# Patient Record
Sex: FEMALE | NOT HISPANIC OR LATINO | Employment: UNEMPLOYED | URBAN - METROPOLITAN AREA
[De-identification: names, ages, dates, MRNs, and addresses within clinical notes are randomized per-mention and may not be internally consistent; named-entity substitution may affect disease eponyms.]

---

## 2023-10-06 DIAGNOSIS — N91.2 AMENORRHEA: Primary | ICD-10-CM

## 2023-10-25 ENCOUNTER — HOSPITAL ENCOUNTER (OUTPATIENT)
Dept: ULTRASOUND IMAGING | Facility: CLINIC | Age: 28
Discharge: HOME | End: 2023-10-25
Attending: ADVANCED PRACTICE MIDWIFE | Admitting: ADVANCED PRACTICE MIDWIFE
Payer: MEDICAID

## 2023-10-25 DIAGNOSIS — N91.2 AMENORRHEA: ICD-10-CM

## 2023-10-25 PROCEDURE — 76801 OB US < 14 WKS SINGLE FETUS: CPT

## 2023-10-25 PROCEDURE — 76801 OB US < 14 WKS SINGLE FETUS: CPT | Mod: 26 | Performed by: RADIOLOGY

## 2023-11-06 ENCOUNTER — LAB REQUISITION (OUTPATIENT)
Dept: LAB | Facility: CLINIC | Age: 28
End: 2023-11-06

## 2023-11-06 DIAGNOSIS — O09.71: ICD-10-CM

## 2023-11-06 LAB
ABO/RH(D): NORMAL
ANTIBODY SCREEN: NEGATIVE
SPECIMEN EXPIRATION DATE: NORMAL

## 2023-11-06 PROCEDURE — 86706 HEP B SURFACE ANTIBODY: CPT | Performed by: MIDWIFE

## 2023-11-06 PROCEDURE — 87389 HIV-1 AG W/HIV-1&-2 AB AG IA: CPT | Performed by: MIDWIFE

## 2023-11-06 PROCEDURE — 86780 TREPONEMA PALLIDUM: CPT | Performed by: MIDWIFE

## 2023-11-06 PROCEDURE — 86803 HEPATITIS C AB TEST: CPT | Performed by: MIDWIFE

## 2023-11-06 PROCEDURE — 86901 BLOOD TYPING SEROLOGIC RH(D): CPT | Performed by: MIDWIFE

## 2023-11-06 PROCEDURE — 86787 VARICELLA-ZOSTER ANTIBODY: CPT | Performed by: MIDWIFE

## 2023-11-06 PROCEDURE — 82306 VITAMIN D 25 HYDROXY: CPT | Performed by: MIDWIFE

## 2023-11-06 PROCEDURE — 87340 HEPATITIS B SURFACE AG IA: CPT | Performed by: MIDWIFE

## 2023-11-06 PROCEDURE — 86850 RBC ANTIBODY SCREEN: CPT | Performed by: MIDWIFE

## 2023-11-06 PROCEDURE — 86762 RUBELLA ANTIBODY: CPT | Performed by: MIDWIFE

## 2023-11-07 LAB
HBV SURFACE AB SERPL IA-ACNC: 0.46 M[IU]/ML
HBV SURFACE AB SERPL IA-ACNC: NONREACTIVE M[IU]/ML
HBV SURFACE AG SERPL QL IA: NONREACTIVE
HCV AB SERPL QL IA: NONREACTIVE
HIV 1+2 AB+HIV1 P24 AG SERPL QL IA: NONREACTIVE
RUBV IGG SERPL QL IA: 1.9 INDEX
RUBV IGG SERPL QL IA: POSITIVE
T PALLIDUM AB SER QL: NONREACTIVE
VIT D+METAB SERPL-MCNC: 11 NG/ML (ref 20–50)
VZV IGG SER QL IA: 674.3 INDEX
VZV IGG SER QL IA: POSITIVE

## 2023-11-20 ENCOUNTER — LAB REQUISITION (OUTPATIENT)
Dept: LAB | Facility: CLINIC | Age: 28
End: 2023-11-20

## 2023-11-20 ENCOUNTER — TRANSCRIBE ORDERS (OUTPATIENT)
Dept: MATERNAL FETAL MEDICINE | Facility: CLINIC | Age: 28
End: 2023-11-20
Payer: MEDICAID

## 2023-11-20 DIAGNOSIS — O09.71: ICD-10-CM

## 2023-11-20 DIAGNOSIS — Z36.89 ENCOUNTER FOR FETAL ANATOMIC SURVEY: Primary | ICD-10-CM

## 2023-11-20 DIAGNOSIS — O26.90 PREGNANCY RELATED CONDITION, ANTEPARTUM: Primary | ICD-10-CM

## 2023-11-20 PROCEDURE — 87591 N.GONORRHOEAE DNA AMP PROB: CPT | Performed by: ADVANCED PRACTICE MIDWIFE

## 2023-11-20 PROCEDURE — 87491 CHLMYD TRACH DNA AMP PROBE: CPT | Performed by: ADVANCED PRACTICE MIDWIFE

## 2023-11-21 LAB
C TRACH DNA SPEC QL NAA+PROBE: NEGATIVE
N GONORRHOEA DNA SPEC QL NAA+PROBE: NEGATIVE

## 2023-12-05 ENCOUNTER — PRE VISIT (OUTPATIENT)
Dept: MATERNAL FETAL MEDICINE | Facility: CLINIC | Age: 28
End: 2023-12-05
Payer: MEDICAID

## 2023-12-12 ENCOUNTER — OFFICE VISIT (OUTPATIENT)
Dept: MATERNAL FETAL MEDICINE | Facility: CLINIC | Age: 28
End: 2023-12-12
Attending: STUDENT IN AN ORGANIZED HEALTH CARE EDUCATION/TRAINING PROGRAM
Payer: MEDICAID

## 2023-12-12 ENCOUNTER — HOSPITAL ENCOUNTER (OUTPATIENT)
Dept: ULTRASOUND IMAGING | Facility: CLINIC | Age: 28
Discharge: HOME | End: 2023-12-12
Attending: STUDENT IN AN ORGANIZED HEALTH CARE EDUCATION/TRAINING PROGRAM
Payer: MEDICAID

## 2023-12-12 DIAGNOSIS — O26.90 PREGNANCY RELATED CONDITION, ANTEPARTUM: ICD-10-CM

## 2023-12-12 DIAGNOSIS — Z36.89 ENCOUNTER FOR FETAL ANATOMIC SURVEY: Primary | ICD-10-CM

## 2023-12-12 PROCEDURE — 99202 OFFICE O/P NEW SF 15 MIN: CPT | Mod: 25 | Performed by: STUDENT IN AN ORGANIZED HEALTH CARE EDUCATION/TRAINING PROGRAM

## 2023-12-12 PROCEDURE — 76805 OB US >/= 14 WKS SNGL FETUS: CPT

## 2023-12-12 PROCEDURE — 76805 OB US >/= 14 WKS SNGL FETUS: CPT | Mod: 26 | Performed by: STUDENT IN AN ORGANIZED HEALTH CARE EDUCATION/TRAINING PROGRAM

## 2023-12-12 NOTE — PROGRESS NOTES
Please see the full imaging report from the [facility] program under the imaging tab.      Lola Caldwell MD  Maternal Fetal Medicine

## 2024-03-06 ENCOUNTER — LAB REQUISITION (OUTPATIENT)
Dept: LAB | Facility: CLINIC | Age: 29
End: 2024-03-06
Payer: MEDICAID

## 2024-03-06 DIAGNOSIS — O09.71: ICD-10-CM

## 2024-03-06 LAB — T PALLIDUM AB SER QL: NONREACTIVE

## 2024-03-06 PROCEDURE — 87491 CHLMYD TRACH DNA AMP PROBE: CPT | Performed by: ADVANCED PRACTICE MIDWIFE

## 2024-03-06 PROCEDURE — 87491 CHLMYD TRACH DNA AMP PROBE: CPT | Mod: ORL | Performed by: ADVANCED PRACTICE MIDWIFE

## 2024-03-06 PROCEDURE — 86780 TREPONEMA PALLIDUM: CPT | Mod: ORL | Performed by: ADVANCED PRACTICE MIDWIFE

## 2024-03-06 PROCEDURE — 82306 VITAMIN D 25 HYDROXY: CPT | Performed by: ADVANCED PRACTICE MIDWIFE

## 2024-03-06 PROCEDURE — 87591 N.GONORRHOEAE DNA AMP PROB: CPT | Performed by: ADVANCED PRACTICE MIDWIFE

## 2024-03-06 PROCEDURE — 87591 N.GONORRHOEAE DNA AMP PROB: CPT | Mod: ORL | Performed by: ADVANCED PRACTICE MIDWIFE

## 2024-03-06 PROCEDURE — 86780 TREPONEMA PALLIDUM: CPT | Performed by: ADVANCED PRACTICE MIDWIFE

## 2024-03-07 LAB
C TRACH DNA SPEC QL NAA+PROBE: NEGATIVE
N GONORRHOEA DNA SPEC QL NAA+PROBE: NEGATIVE
VIT D+METAB SERPL-MCNC: 14 NG/ML (ref 20–50)

## 2024-04-17 ENCOUNTER — LAB REQUISITION (OUTPATIENT)
Dept: LAB | Facility: CLINIC | Age: 29
End: 2024-04-17
Payer: MEDICAID

## 2024-04-17 DIAGNOSIS — O09.73 SUPERVISION OF HIGH RISK PREGNANCY DUE TO SOCIAL PROBLEMS, THIRD TRIMESTER: ICD-10-CM

## 2024-04-17 DIAGNOSIS — L29.9 PRURITUS, UNSPECIFIED: ICD-10-CM

## 2024-04-17 DIAGNOSIS — Z36.89 ENCOUNTER FOR FETAL ANATOMIC SURVEY: Primary | ICD-10-CM

## 2024-04-17 DIAGNOSIS — O09.893 SUPERVISION OF OTHER HIGH RISK PREGNANCIES, THIRD TRIMESTER: ICD-10-CM

## 2024-04-17 PROCEDURE — 82239 BILE ACIDS TOTAL: CPT | Mod: ORL | Performed by: MIDWIFE

## 2024-04-17 PROCEDURE — 84450 TRANSFERASE (AST) (SGOT): CPT | Mod: ORL | Performed by: MIDWIFE

## 2024-04-17 PROCEDURE — 87081 CULTURE SCREEN ONLY: CPT | Mod: ORL | Performed by: MIDWIFE

## 2024-04-17 PROCEDURE — 82247 BILIRUBIN TOTAL: CPT | Mod: ORL | Performed by: MIDWIFE

## 2024-04-17 PROCEDURE — 84460 ALANINE AMINO (ALT) (SGPT): CPT | Mod: ORL | Performed by: MIDWIFE

## 2024-04-19 LAB
ALT SERPL W P-5'-P-CCNC: 15 U/L (ref 0–50)
AST SERPL W P-5'-P-CCNC: 26 U/L (ref 0–45)
BILE AC SERPL-SCNC: 9 UMOL/L
BILIRUB SERPL-MCNC: 0.7 MG/DL

## 2024-04-21 LAB — BACTERIA SPEC CULT: NORMAL

## 2024-04-26 ENCOUNTER — LAB REQUISITION (OUTPATIENT)
Dept: LAB | Facility: CLINIC | Age: 29
End: 2024-04-26
Payer: MEDICAID

## 2024-04-26 DIAGNOSIS — O09.73 SUPERVISION OF HIGH RISK PREGNANCY DUE TO SOCIAL PROBLEMS, THIRD TRIMESTER: ICD-10-CM

## 2024-04-26 LAB
ALT SERPL W P-5'-P-CCNC: 21 U/L (ref 0–50)
AST SERPL W P-5'-P-CCNC: 27 U/L (ref 0–45)

## 2024-04-26 PROCEDURE — 82239 BILE ACIDS TOTAL: CPT | Mod: ORL | Performed by: MIDWIFE

## 2024-04-26 PROCEDURE — 84450 TRANSFERASE (AST) (SGOT): CPT | Mod: ORL | Performed by: MIDWIFE

## 2024-04-26 PROCEDURE — 84460 ALANINE AMINO (ALT) (SGPT): CPT | Mod: ORL | Performed by: MIDWIFE

## 2024-04-27 LAB — BILE AC SERPL-SCNC: 17 UMOL/L

## 2024-05-01 ENCOUNTER — TRANSCRIBE ORDERS (OUTPATIENT)
Dept: MATERNAL FETAL MEDICINE | Facility: CLINIC | Age: 29
End: 2024-05-01
Payer: COMMERCIAL

## 2024-05-01 ENCOUNTER — APPOINTMENT (OUTPATIENT)
Dept: INTERPRETER SERVICES | Facility: CLINIC | Age: 29
End: 2024-05-01
Payer: COMMERCIAL

## 2024-05-01 DIAGNOSIS — O26.90 PREGNANCY RELATED CONDITION, ANTEPARTUM: Primary | ICD-10-CM

## 2024-05-01 DIAGNOSIS — O26.643 CHOLESTASIS DURING PREGNANCY IN THIRD TRIMESTER: ICD-10-CM

## 2024-05-01 PROBLEM — E55.9 VITAMIN D DEFICIENCY: Status: ACTIVE | Noted: 2023-11-08

## 2024-05-01 PROBLEM — O99.019 ANEMIA IN PREGNANCY: Status: ACTIVE | Noted: 2024-03-06

## 2024-05-01 PROBLEM — O09.90 HIGH-RISK PREGNANCY: Status: ACTIVE | Noted: 2023-10-25

## 2024-05-03 ENCOUNTER — HOSPITAL ENCOUNTER (OUTPATIENT)
Dept: ULTRASOUND IMAGING | Facility: CLINIC | Age: 29
Discharge: HOME | End: 2024-05-03
Attending: OBSTETRICS & GYNECOLOGY
Payer: COMMERCIAL

## 2024-05-03 ENCOUNTER — APPOINTMENT (OUTPATIENT)
Dept: INTERPRETER SERVICES | Facility: CLINIC | Age: 29
End: 2024-05-03
Payer: COMMERCIAL

## 2024-05-03 ENCOUNTER — HOSPITAL ENCOUNTER (INPATIENT)
Facility: CLINIC | Age: 29
LOS: 3 days | Discharge: HOME HEALTH | End: 2024-05-06
Attending: ADVANCED PRACTICE MIDWIFE | Admitting: ADVANCED PRACTICE MIDWIFE
Payer: COMMERCIAL

## 2024-05-03 ENCOUNTER — OFFICE VISIT (OUTPATIENT)
Dept: MATERNAL FETAL MEDICINE | Facility: CLINIC | Age: 29
End: 2024-05-03
Attending: OBSTETRICS & GYNECOLOGY
Payer: COMMERCIAL

## 2024-05-03 DIAGNOSIS — O99.013 ANEMIA DURING PREGNANCY IN THIRD TRIMESTER: ICD-10-CM

## 2024-05-03 DIAGNOSIS — O26.90 PREGNANCY RELATED CONDITION, ANTEPARTUM: ICD-10-CM

## 2024-05-03 DIAGNOSIS — O26.643 CHOLESTASIS DURING PREGNANCY IN THIRD TRIMESTER: Primary | ICD-10-CM

## 2024-05-03 LAB
ABO/RH(D): NORMAL
ANTIBODY SCREEN: NEGATIVE
ERYTHROCYTE [DISTWIDTH] IN BLOOD BY AUTOMATED COUNT: 12.5 % (ref 10–15)
HCT VFR BLD AUTO: 31.5 % (ref 35–47)
HGB BLD-MCNC: 10.6 G/DL (ref 11.7–15.7)
MCH RBC QN AUTO: 31.2 PG (ref 26.5–33)
MCHC RBC AUTO-ENTMCNC: 33.7 G/DL (ref 31.5–36.5)
MCV RBC AUTO: 93 FL (ref 78–100)
PLATELET # BLD AUTO: 178 10E3/UL (ref 150–450)
RBC # BLD AUTO: 3.4 10E6/UL (ref 3.8–5.2)
SARS-COV-2 RNA RESP QL NAA+PROBE: NEGATIVE
SPECIMEN EXPIRATION DATE: NORMAL
T PALLIDUM AB SER QL: NONREACTIVE
WBC # BLD AUTO: 5.6 10E3/UL (ref 4–11)

## 2024-05-03 PROCEDURE — 86780 TREPONEMA PALLIDUM: CPT | Performed by: ADVANCED PRACTICE MIDWIFE

## 2024-05-03 PROCEDURE — 86900 BLOOD TYPING SEROLOGIC ABO: CPT | Performed by: ADVANCED PRACTICE MIDWIFE

## 2024-05-03 PROCEDURE — 87635 SARS-COV-2 COVID-19 AMP PRB: CPT | Performed by: ADVANCED PRACTICE MIDWIFE

## 2024-05-03 PROCEDURE — 120N000002 HC R&B MED SURG/OB UMMC

## 2024-05-03 PROCEDURE — 76819 FETAL BIOPHYS PROFIL W/O NST: CPT

## 2024-05-03 PROCEDURE — 76819 FETAL BIOPHYS PROFIL W/O NST: CPT | Mod: 26 | Performed by: OBSTETRICS & GYNECOLOGY

## 2024-05-03 PROCEDURE — 36415 COLL VENOUS BLD VENIPUNCTURE: CPT | Performed by: ADVANCED PRACTICE MIDWIFE

## 2024-05-03 PROCEDURE — 84450 TRANSFERASE (AST) (SGOT): CPT | Performed by: ADVANCED PRACTICE MIDWIFE

## 2024-05-03 PROCEDURE — 85027 COMPLETE CBC AUTOMATED: CPT | Performed by: ADVANCED PRACTICE MIDWIFE

## 2024-05-03 PROCEDURE — 99214 OFFICE O/P EST MOD 30 MIN: CPT | Mod: 25 | Performed by: OBSTETRICS & GYNECOLOGY

## 2024-05-03 PROCEDURE — 84460 ALANINE AMINO (ALT) (SGPT): CPT | Performed by: ADVANCED PRACTICE MIDWIFE

## 2024-05-03 PROCEDURE — 250N000013 HC RX MED GY IP 250 OP 250 PS 637: Performed by: ADVANCED PRACTICE MIDWIFE

## 2024-05-03 PROCEDURE — 76816 OB US FOLLOW-UP PER FETUS: CPT | Mod: 26 | Performed by: OBSTETRICS & GYNECOLOGY

## 2024-05-03 RX ORDER — NALOXONE HYDROCHLORIDE 0.4 MG/ML
0.2 INJECTION, SOLUTION INTRAMUSCULAR; INTRAVENOUS; SUBCUTANEOUS
Status: DISCONTINUED | OUTPATIENT
Start: 2024-05-03 | End: 2024-05-04 | Stop reason: HOSPADM

## 2024-05-03 RX ORDER — CARBOPROST TROMETHAMINE 250 UG/ML
250 INJECTION, SOLUTION INTRAMUSCULAR
Status: DISCONTINUED | OUTPATIENT
Start: 2024-05-03 | End: 2024-05-04 | Stop reason: HOSPADM

## 2024-05-03 RX ORDER — PROCHLORPERAZINE MALEATE 10 MG
10 TABLET ORAL EVERY 6 HOURS PRN
Status: DISCONTINUED | OUTPATIENT
Start: 2024-05-03 | End: 2024-05-04 | Stop reason: HOSPADM

## 2024-05-03 RX ORDER — MISOPROSTOL 200 UG/1
400 TABLET ORAL
Status: DISCONTINUED | OUTPATIENT
Start: 2024-05-03 | End: 2024-05-04 | Stop reason: HOSPADM

## 2024-05-03 RX ORDER — IBUPROFEN 800 MG/1
800 TABLET, FILM COATED ORAL
Status: DISCONTINUED | OUTPATIENT
Start: 2024-05-03 | End: 2024-05-06 | Stop reason: HOSPADM

## 2024-05-03 RX ORDER — METHYLERGONOVINE MALEATE 0.2 MG/ML
200 INJECTION INTRAVENOUS
Status: DISCONTINUED | OUTPATIENT
Start: 2024-05-03 | End: 2024-05-04 | Stop reason: HOSPADM

## 2024-05-03 RX ORDER — CITRIC ACID/SODIUM CITRATE 334-500MG
30 SOLUTION, ORAL ORAL
Status: DISCONTINUED | OUTPATIENT
Start: 2024-05-03 | End: 2024-05-04 | Stop reason: HOSPADM

## 2024-05-03 RX ORDER — FERROUS SULFATE 325(65) MG
325 TABLET ORAL
COMMUNITY

## 2024-05-03 RX ORDER — HYDROXYZINE HYDROCHLORIDE 50 MG/1
50-100 TABLET, FILM COATED ORAL
Status: DISCONTINUED | OUTPATIENT
Start: 2024-05-03 | End: 2024-05-04 | Stop reason: HOSPADM

## 2024-05-03 RX ORDER — TRANEXAMIC ACID 10 MG/ML
1 INJECTION, SOLUTION INTRAVENOUS EVERY 30 MIN PRN
Status: DISCONTINUED | OUTPATIENT
Start: 2024-05-03 | End: 2024-05-04 | Stop reason: HOSPADM

## 2024-05-03 RX ORDER — ONDANSETRON 4 MG/1
4 TABLET, ORALLY DISINTEGRATING ORAL EVERY 6 HOURS PRN
Status: DISCONTINUED | OUTPATIENT
Start: 2024-05-03 | End: 2024-05-04 | Stop reason: HOSPADM

## 2024-05-03 RX ORDER — KETOROLAC TROMETHAMINE 30 MG/ML
30 INJECTION, SOLUTION INTRAMUSCULAR; INTRAVENOUS
Status: DISCONTINUED | OUTPATIENT
Start: 2024-05-03 | End: 2024-05-06 | Stop reason: HOSPADM

## 2024-05-03 RX ORDER — LOPERAMIDE HCL 2 MG
2 CAPSULE ORAL
Status: DISCONTINUED | OUTPATIENT
Start: 2024-05-03 | End: 2024-05-04 | Stop reason: HOSPADM

## 2024-05-03 RX ORDER — NALOXONE HYDROCHLORIDE 0.4 MG/ML
0.4 INJECTION, SOLUTION INTRAMUSCULAR; INTRAVENOUS; SUBCUTANEOUS
Status: DISCONTINUED | OUTPATIENT
Start: 2024-05-03 | End: 2024-05-04 | Stop reason: HOSPADM

## 2024-05-03 RX ORDER — OXYTOCIN 10 [USP'U]/ML
10 INJECTION, SOLUTION INTRAMUSCULAR; INTRAVENOUS
Status: DISCONTINUED | OUTPATIENT
Start: 2024-05-03 | End: 2024-05-06 | Stop reason: HOSPADM

## 2024-05-03 RX ORDER — OXYTOCIN/0.9 % SODIUM CHLORIDE 30/500 ML
100-340 PLASTIC BAG, INJECTION (ML) INTRAVENOUS CONTINUOUS PRN
Status: DISCONTINUED | OUTPATIENT
Start: 2024-05-03 | End: 2024-05-06 | Stop reason: HOSPADM

## 2024-05-03 RX ORDER — OXYTOCIN 10 [USP'U]/ML
10 INJECTION, SOLUTION INTRAMUSCULAR; INTRAVENOUS
Status: DISCONTINUED | OUTPATIENT
Start: 2024-05-03 | End: 2024-05-04 | Stop reason: HOSPADM

## 2024-05-03 RX ORDER — PROCHLORPERAZINE 25 MG
25 SUPPOSITORY, RECTAL RECTAL EVERY 12 HOURS PRN
Status: DISCONTINUED | OUTPATIENT
Start: 2024-05-03 | End: 2024-05-04 | Stop reason: HOSPADM

## 2024-05-03 RX ORDER — MORPHINE SULFATE 10 MG/ML
10 INJECTION, SOLUTION INTRAMUSCULAR; INTRAVENOUS
Status: DISCONTINUED | OUTPATIENT
Start: 2024-05-03 | End: 2024-05-04 | Stop reason: HOSPADM

## 2024-05-03 RX ORDER — OXYTOCIN/0.9 % SODIUM CHLORIDE 30/500 ML
340 PLASTIC BAG, INJECTION (ML) INTRAVENOUS CONTINUOUS PRN
Status: DISCONTINUED | OUTPATIENT
Start: 2024-05-03 | End: 2024-05-04 | Stop reason: HOSPADM

## 2024-05-03 RX ORDER — METOCLOPRAMIDE 10 MG/1
10 TABLET ORAL EVERY 6 HOURS PRN
Status: DISCONTINUED | OUTPATIENT
Start: 2024-05-03 | End: 2024-05-04 | Stop reason: HOSPADM

## 2024-05-03 RX ORDER — METOCLOPRAMIDE HYDROCHLORIDE 5 MG/ML
10 INJECTION INTRAMUSCULAR; INTRAVENOUS EVERY 6 HOURS PRN
Status: DISCONTINUED | OUTPATIENT
Start: 2024-05-03 | End: 2024-05-04 | Stop reason: HOSPADM

## 2024-05-03 RX ORDER — TERBUTALINE SULFATE 1 MG/ML
0.25 INJECTION, SOLUTION SUBCUTANEOUS
Status: DISCONTINUED | OUTPATIENT
Start: 2024-05-03 | End: 2024-05-04 | Stop reason: HOSPADM

## 2024-05-03 RX ORDER — ACETAMINOPHEN 325 MG/1
650 TABLET ORAL EVERY 4 HOURS PRN
Status: DISCONTINUED | OUTPATIENT
Start: 2024-05-03 | End: 2024-05-04 | Stop reason: HOSPADM

## 2024-05-03 RX ORDER — MISOPROSTOL 200 UG/1
800 TABLET ORAL
Status: DISCONTINUED | OUTPATIENT
Start: 2024-05-03 | End: 2024-05-04 | Stop reason: HOSPADM

## 2024-05-03 RX ORDER — SODIUM CHLORIDE, SODIUM LACTATE, POTASSIUM CHLORIDE, CALCIUM CHLORIDE 600; 310; 30; 20 MG/100ML; MG/100ML; MG/100ML; MG/100ML
INJECTION, SOLUTION INTRAVENOUS CONTINUOUS
Status: DISCONTINUED | OUTPATIENT
Start: 2024-05-03 | End: 2024-05-04 | Stop reason: HOSPADM

## 2024-05-03 RX ORDER — ONDANSETRON 2 MG/ML
4 INJECTION INTRAMUSCULAR; INTRAVENOUS EVERY 6 HOURS PRN
Status: DISCONTINUED | OUTPATIENT
Start: 2024-05-03 | End: 2024-05-04 | Stop reason: HOSPADM

## 2024-05-03 RX ORDER — LOPERAMIDE HCL 2 MG
4 CAPSULE ORAL
Status: DISCONTINUED | OUTPATIENT
Start: 2024-05-03 | End: 2024-05-04 | Stop reason: HOSPADM

## 2024-05-03 RX ORDER — FENTANYL CITRATE 50 UG/ML
100 INJECTION, SOLUTION INTRAMUSCULAR; INTRAVENOUS
Status: DISCONTINUED | OUTPATIENT
Start: 2024-05-03 | End: 2024-05-04 | Stop reason: HOSPADM

## 2024-05-03 RX ORDER — MISOPROSTOL 100 UG/1
25 TABLET ORAL
Status: DISCONTINUED | OUTPATIENT
Start: 2024-05-03 | End: 2024-05-04 | Stop reason: HOSPADM

## 2024-05-03 RX ADMIN — MISOPROSTOL 25 MCG: 100 TABLET ORAL at 18:23

## 2024-05-03 RX ADMIN — MISOPROSTOL 25 MCG: 100 TABLET ORAL at 20:37

## 2024-05-03 ASSESSMENT — ACTIVITIES OF DAILY LIVING (ADL)
ADLS_ACUITY_SCORE: 18
ADLS_ACUITY_SCORE: 35
ADLS_ACUITY_SCORE: 18

## 2024-05-03 NOTE — PROGRESS NOTES
Please see full imaging report from [facility] program under imaging tab.    Thank-you for referring your patient for ultrasound assessment.    I discussed the findings on today's ultrasound with the patient using the assistance of an iPad  for Mauritanian. The patient was able to repeat back my recommendations and plan.     She has elevated bile acids, noctural itching and is at term. She was not started on ursodiol as she initially reported resolved itching, but at this point in gestation, her options are to start ursodiol or to undergo induction of labor. After shared-decision making, and conversation with her CNM team, we determined that the best next step for her would be induction of labor this evening or tomorrow morning. She is open to considering this plan.     We talked also about recurrence risk in future pregnancies as well today.     If you have questions regarding today's evaluation or if we can be of further service, please contact the [facility].     I spent a total of 35 minutes on the date of this encounter including preparing to see the patient (reviewing medical records/tests), counseling and discussing the plan of care, documenting the visit in the electronic medical record, and communicating with other health care professionals and/or care coordination.    Luis Haas MD  Maternal Fetal Medicine

## 2024-05-03 NOTE — NURSING NOTE
() used for patient's ultrasound and provider visit today at [facility]. Patient reports positive fetal movement, denies pain, denies contractions, leaking of fluid, or bleeding.  Reports itching last night on her arms, legs and chest. Reviewed medications that were prescribed by midwives in the clinic. Education provided to patient on today's ultrasound.  SBAR given to [facility] MD, see their note in Epic.    Recommendations for patient to be induced today or tomorrow, Estefani Randall [facility] called and discussed recommendations with Dr. Haas. [facility] at 4pm. Patient verbalized understanding of these instructions, given instructions on how to get there and her questions were answered. Labor charge (Rabia) was called and given report.

## 2024-05-03 NOTE — PROGRESS NOTES
"ADMIT NOTE  =================  38w3d    Ama Kingsley is a 29 year old female with an Patient's last menstrual period was 08/15/2023. and Estimated Date of Delivery: May 14, 2024 is [facility]/2024 at 4:54 PM for induction of labor.  Indication: cholestasis.     In-person  present for admission.     HPI  ================  Ama Kingsley is a  at 38w3d, presenting to unit for IOL d/t cholestasis. She was seen today by MFM, where they reviewed her increased bile acids (17 on ), not treated on ursodiol, and was offered initiation of ursodiol therapy or IOL as she is term, and patient elected for IOL. She notes infrequent contractions, denies LOF and vaginal bleeding, and endorses normal fetal movement.  Denies fever, cough, SOB or chest pain. Denies having contact with anyone who is Covid-19 positive. Pt has had Covid-19 Vaccinations.  Agreeable to Covid-19 testing  Contractions- irreg  Fetal movement- active  ROM- no.  Vaginal bleeding- none  GBS- negative  FOB- is not involved. Planning labor support from her family, not here yet .    Weight gain- 154 - 171 lbs, Total weight gain- 17 lbs  Height- 5'4.5\"  BMI- 26  First prenatal visit at 11 weeks, Total visits- 8    PROBLEM LIST  =================  Patient Active Problem List    Diagnosis Date Noted    Cholestasis during pregnancy in third trimester 2024     Priority: Medium    Anemia in pregnancy 2024     Priority: Medium     Formatting of this note might be different from the original.  3/6/24 iron sent to pharmacy      Vitamin D deficiency 2023     Priority: Medium     Formatting of this note might be different from the original.  3/6/24: 14      High-risk pregnancy 10/25/2023     Priority: Medium     Formatting of this note might be different from the original.  [facility] CNM   Partner's name: not involved Servando - will offer some financial support  [x] Entered on [facility] active OB patient " list  [x] NOB folder  [x] First tri screen   declined  [x] QS/AFP declined  [x] Started ASA low risk   [x] Fetal anatomy US ordered  [X] Rubella immune  [x] Varicella Immune  [ ] Hep B Immune- #1   [ ] Pap POSTPARTUM    [x] EOB folder  [x] FLU shot  [x] COVID vax  [x] GCT, passed  [NA] Rhogam if needed, date:  [NA] TOLAC consent done  [ ] Waterbirth declines,consent done  [ ] Breast pump msg sent to RN team  [x] Car seat   [x] TDAP given 3/6/24  [ ] PP Contraception plan: If tubal,consent date:  [ ] Labor plans: Brother may be able to come, desires   [ ] : desires, msg sent!  [x] Infant feeding plan: breast  [x] Infant pediatrician: [facility]    [x] GBS neg  [ ] OTC PP meds sent   [X] PP Support: The neighbor will help with cooking and laundry after the baby is born  Also one of her daughters is 10 and is able to help with a lot of chores    Servando lives elsewhere and provides financial support but they are not in a romantic relationship         HISTORIES  ============  No Known Allergies  History reviewed. No pertinent past medical history.  History reviewed. No pertinent surgical history..  History reviewed. No pertinent family history.  Social History     Tobacco Use    Smoking status: Never    Smokeless tobacco: Never   Substance Use Topics    Alcohol use: Not Currently     OB History    Para Term  AB Living   3 2 2 0 0 2   SAB IAB Ectopic Multiple Live Births   0 0 0 0 2      # Outcome Date GA Lbr Mino/2nd Weight Sex Type Anes PTL Lv   3 Current            2 Term      Vag-Spont   KAYKAY   1 Term      Vag-Spont   KAYKAY        LABS:   ===========  Prenatal Labs:  Rhogam not indicated   Lab Results   Component Value Date    AS Negative 2023    RUQIGG Positive 2023    HEPBANG Nonreactive 2023    HIAGAB Nonreactive 2023     Rubella immune    GBS negative -     Other labs:  COVID-19 PCR Results           No data to display              COVID-19 Antibody Results, Testing  for Immunity           No data to display               Results for orders placed or performed during the hospital encounter of 24 (from the past 24 hour(s))   [facility] US Comprehensive Single F/U    Narrative            Comp Follow Up  ---------------------------------------------------------------------------------------------------------  Pat. Name: MAYURI HENRY       Study Date:  2024 8:33am  Pat. NO:  [id number]        Referring  MD: NEERAJ BORGES  Site:  [facility]       Sonographer: Deloris Ly RDMS   :  1995        Age:   29  ---------------------------------------------------------------------------------------------------------    INDICATION  ---------------------------------------------------------------------------------------------------------  Intrahepatic cholestasis of pregnancy - not currently on ursodiol      METHOD  ---------------------------------------------------------------------------------------------------------  Transabdominal ultrasound examination. View: Sufficient      PREGNANCY  ---------------------------------------------------------------------------------------------------------  Blount pregnancy. Number of fetuses: 1      DATING  ---------------------------------------------------------------------------------------------------------                                           Date                                Details                                                                                      Gest. age                      CARMINE  LMP                                  8/15/2023                                                                                                                         37 w + 3 d                     2024  Prior assessment               10/25/2023                       GA: 11 w + 1 d                                                                           38 w + 3 d                     2024  U/S                                    5/3/2024                          based upon AC, BPD, Femur                                                       38 w + 5 d                     5/12/2024  Assigned dating                  Dating performed on 12/12/2023, based on the prior assessment (on 10/25/2023)                   38 w + 3 d                     5/14/2024      GENERAL EVALUATION  ---------------------------------------------------------------------------------------------------------  Cardiac activity present.  bpm.  Fetal movements present.  Presentation cephalic.  Placenta Posterior, No Previa, > 2 cm from internal os.  Umbilical cord 3 vessel cord.  Amniotic fluid Amount of AF: normal. MVP 5.8 cm.      FETAL BIOMETRY  ---------------------------------------------------------------------------------------------------------  Main Fetal Biometry:  BPD                                        94.3                    mm                         38w 3d                Hadlock  OFD                                        131.7                  mm                         -/-                 Nicolaides  HC                                          361.2                  mm                          -/-                Hadlock  AC                                          365.6                  mm                          40w 3d        98%        Hadlock  Femur                                      73.2                   mm                          37w 3d                Hadlock  Fetal Weight Calculation:  EFW                                       3,907                  g                                     91%        Hadlock  EFW (lb,oz)                             8 lb 10                oz  EFW by                                        Hadlock (BPD-HC-AC-FL)  Head / Face / Neck Biometry:                                             4.2                     mm      FETAL  ANATOMY  ---------------------------------------------------------------------------------------------------------  The following structures appear normal:  Head / Neck                         Cranium. Head size. Head shape. Lateral ventricles. Midline falx. Cavum septi pellucidi. Cerebellum. Cisterna magna. Thalami.  Face                                   Lips. Profile. Nose.  Heart / Thorax                      4-chamber view. RVOT view. LVOT view. 3-vessel-trachea view.                                             Diaphragm.  Abdomen                             Stomach. Kidneys. Bladder.  Spine                                  Cervical spine. Thoracic spine. Lumbar spine. Sacral spine.    Gender: male.      BIOPHYSICAL PROFILE  ---------------------------------------------------------------------------------------------------------  2: Fetal breathing movements  2: Gross body movements  2: Fetal tone  2: Amniotic fluid volume  8/8 Biophysical profile score  Interpretation: normal      MATERNAL STRUCTURES  ---------------------------------------------------------------------------------------------------------  Cervix                                  Suboptimal  Right Ovary                          Not examined  Left Ovary                            Not examined      RECOMMENDATION  ---------------------------------------------------------------------------------------------------------  Thank-you for referring your patient for ultrasound assessment.    I discussed the findings on today's ultrasound with the patient using the assistance of an iPad  for Romanian. The patient was able to repeat back my  recommendations and plan.    She has elevated bile acids, noctural itching and is at term. She was not started on ursodiol as she initially reported resolved itching, but at this point in gestation, her  options are to start ursodiol or to undergo induction of labor. After shared-decision making, and  "conversation with her CNM team, we determined that the best next step for  her would be induction of labor this evening or tomorrow morning. She is open to considering this plan.    We talked also about recurrence risk in future pregnancies as well today.    If you have questions regarding today's evaluation or if we can be of further service, please contact the [facility].    I spent a total of 35 minutes on the date of this encounter including preparing to see the patient (reviewing medical records/tests), counseling and discussing the plan of  care, documenting the visit in the electronic medical record, and communicating with other health care professionals and/or care coordination.        Impression    IMPRESSION  ---------------------------------------------------------------------------------------------------------  1. Blount pregnancy at 38w 3d gestational age.  2. None of the anomalies commonly detected by ultrasound were evident in the limited fetal anatomic survey as described above.  3. Growth parameters and estimated fetal weight were consistent with gestational age predicted by assigned CARMINE. EFW 91%.  4. The amniotic fluid volume appeared normal.  5. BPP is reassuring at 8/8, performed due to diagnosis of intrahepatic cholestasis of pregnancy (ICP).  6. Cephalic lie.           ROS  =========  Pt denies significant respiratory, cardiovacular, GI, or muscular/skeletalcomplaints.    See RN data base ROS.     PHYSICAL EXAM:  ===============  /62 (BP Location: Left arm, Patient Position: Semi-Vazquez's, Cuff Size: Adult Regular)   Pulse 75   Temp 98.4  F (36.9  C) (Oral)   Resp 20   Ht 1.65 m (5' 4.96\")   LMP 08/15/2023   General appearance: comfortable  GENERAL APPEARANCE: healthy, alert and no distress  RESP: normal respiratory effort  CV: normal perfusion  ABDOMEN:  soft, nontender, no epigastric pain  SKIN: no suspicious lesions or rashes  NEURO: Denies headache, " blurred vision, other vision changes  PSYCH: mentation appears normal. and affect normal/bright    Abdomen: gravid, vertex fetus per Leopold's, non-tender between contractions.   Cephalic presentation confirmed by US today with MFM  EFW-  8 lbs 10oz   CONTRACTIONS: irreg  FETAL HEART TONES: continuous EFM- baseline 145 with moderate variability and positive accelerations. No decelerations.  PELVIC EXAM: closed/30%/-2. Posterior/med  URBINA SCORE: 2  BLOODY SHOW: no   ROM:no  FLUID: clear and none  ROMPLUS: not done    # Pain Assessment:   Ama keene pain level was assessed and she currently denies pain.        ASSESSMENT:  ==============    IUP @ 38w3d admitted for induction of labor.  Indication: cholestasis   NST REACTIVE  Fetal Heart Tones - category one  GBS- negative  Covid- pending  Patient Active Problem List   Diagnosis    High-risk pregnancy    Anemia in pregnancy    Vitamin D deficiency    Cholestasis during pregnancy in third trimester       PLAN:  ===========  -Admit - see IP orders  - Reviewed IOL process and expectations with patient. Discussed methods of cervical ripening, reviewed cervix closed on exam and recommended oral or vaginal misoprostol, patient prefers PO.   - Cervical ripening with oral Misoprostol ordered, risks and benefits reviewed with pt. Agreeable to plan.  - Ambulation, hydration, position changes, birthing ball and tub options to facilitate labor reviewed with pt .  - Pain medication options of nitrous oxide, fentanyl IV and epidural anesthesia reviewed with pt. Pt is undecided, and unsure what she used with previous labors.  - Therapeutic sleep with hydroxyzine and morphine offered to patient,overnight as needed.   - Reevaluate as clinically indicated.    I, Sherri Rahman, am serving as a scribe; to document services personally performed by  Estefani Randall CNM based on data collection and the provider's statements to me.     FAYE Gorman  I agree with the PFSH and ROS  as completed by FAYE Gorman except for changes made by me. The remainder of the encounter was performed by me and scribed by FAYE Gorman. The scribed note accurately reflects my personal services and decisions made by me.   JARRETT Rod CNM

## 2024-05-04 LAB
ALT SERPL W P-5'-P-CCNC: 16 U/L (ref 0–50)
AST SERPL W P-5'-P-CCNC: 24 U/L (ref 0–45)

## 2024-05-04 PROCEDURE — 250N000009 HC RX 250

## 2024-05-04 PROCEDURE — 120N000002 HC R&B MED SURG/OB UMMC

## 2024-05-04 PROCEDURE — 59410 OBSTETRICAL CARE: CPT | Performed by: ADVANCED PRACTICE MIDWIFE

## 2024-05-04 PROCEDURE — 3E0P7VZ INTRODUCTION OF HORMONE INTO FEMALE REPRODUCTIVE, VIA NATURAL OR ARTIFICIAL OPENING: ICD-10-PCS | Performed by: ADVANCED PRACTICE MIDWIFE

## 2024-05-04 PROCEDURE — 999N000016 HC STATISTIC ATTENDANCE AT DELIVERY

## 2024-05-04 PROCEDURE — 258N000003 HC RX IP 258 OP 636: Performed by: ADVANCED PRACTICE MIDWIFE

## 2024-05-04 PROCEDURE — 250N000013 HC RX MED GY IP 250 OP 250 PS 637: Performed by: ADVANCED PRACTICE MIDWIFE

## 2024-05-04 PROCEDURE — 3E033VJ INTRODUCTION OF OTHER HORMONE INTO PERIPHERAL VEIN, PERCUTANEOUS APPROACH: ICD-10-PCS | Performed by: ADVANCED PRACTICE MIDWIFE

## 2024-05-04 PROCEDURE — 250N000011 HC RX IP 250 OP 636: Performed by: ADVANCED PRACTICE MIDWIFE

## 2024-05-04 PROCEDURE — 59300 EPISIOTOMY OR VAGINAL REPAIR: CPT | Mod: GC | Performed by: STUDENT IN AN ORGANIZED HEALTH CARE EDUCATION/TRAINING PROGRAM

## 2024-05-04 PROCEDURE — 0KQM0ZZ REPAIR PERINEUM MUSCLE, OPEN APPROACH: ICD-10-PCS | Performed by: ADVANCED PRACTICE MIDWIFE

## 2024-05-04 PROCEDURE — 10907ZC DRAINAGE OF AMNIOTIC FLUID, THERAPEUTIC FROM PRODUCTS OF CONCEPTION, VIA NATURAL OR ARTIFICIAL OPENING: ICD-10-PCS | Performed by: ADVANCED PRACTICE MIDWIFE

## 2024-05-04 PROCEDURE — 250N000009 HC RX 250: Performed by: ADVANCED PRACTICE MIDWIFE

## 2024-05-04 RX ORDER — OXYTOCIN/0.9 % SODIUM CHLORIDE 30/500 ML
1-24 PLASTIC BAG, INJECTION (ML) INTRAVENOUS CONTINUOUS
Status: DISCONTINUED | OUTPATIENT
Start: 2024-05-04 | End: 2024-05-04 | Stop reason: HOSPADM

## 2024-05-04 RX ORDER — MISOPROSTOL 200 UG/1
800 TABLET ORAL
Status: DISCONTINUED | OUTPATIENT
Start: 2024-05-04 | End: 2024-05-06 | Stop reason: HOSPADM

## 2024-05-04 RX ORDER — DOCUSATE SODIUM 100 MG/1
100 CAPSULE, LIQUID FILLED ORAL 2 TIMES DAILY
Status: DISCONTINUED | OUTPATIENT
Start: 2024-05-04 | End: 2024-05-06 | Stop reason: HOSPADM

## 2024-05-04 RX ORDER — FENTANYL CITRATE-0.9 % NACL/PF 10 MCG/ML
100 PLASTIC BAG, INJECTION (ML) INTRAVENOUS EVERY 5 MIN PRN
Status: CANCELLED | OUTPATIENT
Start: 2024-05-04

## 2024-05-04 RX ORDER — HYDROCORTISONE 25 MG/G
CREAM TOPICAL 3 TIMES DAILY PRN
Status: DISCONTINUED | OUTPATIENT
Start: 2024-05-04 | End: 2024-05-06 | Stop reason: HOSPADM

## 2024-05-04 RX ORDER — LOPERAMIDE HCL 2 MG
2 CAPSULE ORAL
Status: DISCONTINUED | OUTPATIENT
Start: 2024-05-04 | End: 2024-05-06 | Stop reason: HOSPADM

## 2024-05-04 RX ORDER — TRANEXAMIC ACID 10 MG/ML
1 INJECTION, SOLUTION INTRAVENOUS EVERY 30 MIN PRN
Status: DISCONTINUED | OUTPATIENT
Start: 2024-05-04 | End: 2024-05-06 | Stop reason: HOSPADM

## 2024-05-04 RX ORDER — ACETAMINOPHEN 325 MG/1
650 TABLET ORAL EVERY 4 HOURS PRN
Status: DISCONTINUED | OUTPATIENT
Start: 2024-05-04 | End: 2024-05-06 | Stop reason: HOSPADM

## 2024-05-04 RX ORDER — DOCUSATE SODIUM 100 MG/1
100 CAPSULE, LIQUID FILLED ORAL DAILY
Status: DISCONTINUED | OUTPATIENT
Start: 2024-05-05 | End: 2024-05-04

## 2024-05-04 RX ORDER — BISACODYL 10 MG
10 SUPPOSITORY, RECTAL RECTAL DAILY PRN
Status: DISCONTINUED | OUTPATIENT
Start: 2024-05-04 | End: 2024-05-06 | Stop reason: HOSPADM

## 2024-05-04 RX ORDER — MODIFIED LANOLIN
OINTMENT (GRAM) TOPICAL
Status: DISCONTINUED | OUTPATIENT
Start: 2024-05-04 | End: 2024-05-06 | Stop reason: HOSPADM

## 2024-05-04 RX ORDER — HYDROXYZINE HYDROCHLORIDE 50 MG/1
100 TABLET, FILM COATED ORAL
Status: DISCONTINUED | OUTPATIENT
Start: 2024-05-04 | End: 2024-05-04 | Stop reason: HOSPADM

## 2024-05-04 RX ORDER — NALBUPHINE HYDROCHLORIDE 20 MG/ML
2.5-5 INJECTION, SOLUTION INTRAMUSCULAR; INTRAVENOUS; SUBCUTANEOUS EVERY 6 HOURS PRN
Status: CANCELLED | OUTPATIENT
Start: 2024-05-04

## 2024-05-04 RX ORDER — FENTANYL/ROPIVACAINE/NS/PF 2MCG/ML-.1
PLASTIC BAG, INJECTION (ML) EPIDURAL
Status: CANCELLED | OUTPATIENT
Start: 2024-05-04

## 2024-05-04 RX ORDER — CARBOPROST TROMETHAMINE 250 UG/ML
250 INJECTION, SOLUTION INTRAMUSCULAR
Status: DISCONTINUED | OUTPATIENT
Start: 2024-05-04 | End: 2024-05-06 | Stop reason: HOSPADM

## 2024-05-04 RX ORDER — IBUPROFEN 800 MG/1
800 TABLET, FILM COATED ORAL EVERY 6 HOURS PRN
Status: DISCONTINUED | OUTPATIENT
Start: 2024-05-04 | End: 2024-05-06 | Stop reason: HOSPADM

## 2024-05-04 RX ORDER — OXYTOCIN 10 [USP'U]/ML
INJECTION, SOLUTION INTRAMUSCULAR; INTRAVENOUS
Status: DISCONTINUED
Start: 2024-05-04 | End: 2024-05-05 | Stop reason: WASHOUT

## 2024-05-04 RX ORDER — OXYTOCIN 10 [USP'U]/ML
10 INJECTION, SOLUTION INTRAMUSCULAR; INTRAVENOUS
Status: DISCONTINUED | OUTPATIENT
Start: 2024-05-04 | End: 2024-05-06 | Stop reason: HOSPADM

## 2024-05-04 RX ORDER — MISOPROSTOL 200 UG/1
TABLET ORAL
Status: DISCONTINUED
Start: 2024-05-04 | End: 2024-05-05 | Stop reason: HOSPADM

## 2024-05-04 RX ORDER — ONDANSETRON 2 MG/ML
4 INJECTION INTRAMUSCULAR; INTRAVENOUS EVERY 6 HOURS PRN
Status: CANCELLED | OUTPATIENT
Start: 2024-05-04

## 2024-05-04 RX ORDER — OXYTOCIN/0.9 % SODIUM CHLORIDE 30/500 ML
340 PLASTIC BAG, INJECTION (ML) INTRAVENOUS CONTINUOUS PRN
Status: DISCONTINUED | OUTPATIENT
Start: 2024-05-04 | End: 2024-05-06 | Stop reason: HOSPADM

## 2024-05-04 RX ORDER — MISOPROSTOL 100 UG/1
25 TABLET ORAL EVERY 4 HOURS PRN
Status: DISCONTINUED | OUTPATIENT
Start: 2024-05-04 | End: 2024-05-04 | Stop reason: HOSPADM

## 2024-05-04 RX ORDER — SODIUM CHLORIDE, SODIUM LACTATE, POTASSIUM CHLORIDE, CALCIUM CHLORIDE 600; 310; 30; 20 MG/100ML; MG/100ML; MG/100ML; MG/100ML
INJECTION, SOLUTION INTRAVENOUS CONTINUOUS PRN
Status: DISCONTINUED | OUTPATIENT
Start: 2024-05-04 | End: 2024-05-04 | Stop reason: HOSPADM

## 2024-05-04 RX ORDER — LOPERAMIDE HCL 2 MG
4 CAPSULE ORAL
Status: DISCONTINUED | OUTPATIENT
Start: 2024-05-04 | End: 2024-05-06 | Stop reason: HOSPADM

## 2024-05-04 RX ORDER — METHYLERGONOVINE MALEATE 0.2 MG/ML
200 INJECTION INTRAVENOUS
Status: DISCONTINUED | OUTPATIENT
Start: 2024-05-04 | End: 2024-05-06 | Stop reason: HOSPADM

## 2024-05-04 RX ORDER — ONDANSETRON 4 MG/1
4 TABLET, ORALLY DISINTEGRATING ORAL EVERY 6 HOURS PRN
Status: CANCELLED | OUTPATIENT
Start: 2024-05-04

## 2024-05-04 RX ORDER — LIDOCAINE HYDROCHLORIDE 10 MG/ML
INJECTION, SOLUTION EPIDURAL; INFILTRATION; INTRACAUDAL; PERINEURAL
Status: COMPLETED
Start: 2024-05-04 | End: 2024-05-04

## 2024-05-04 RX ORDER — MISOPROSTOL 200 UG/1
400 TABLET ORAL
Status: DISCONTINUED | OUTPATIENT
Start: 2024-05-04 | End: 2024-05-06 | Stop reason: HOSPADM

## 2024-05-04 RX ORDER — LIDOCAINE 40 MG/G
CREAM TOPICAL
Status: DISCONTINUED | OUTPATIENT
Start: 2024-05-04 | End: 2024-05-04 | Stop reason: HOSPADM

## 2024-05-04 RX ADMIN — KETOROLAC TROMETHAMINE 30 MG: 30 INJECTION, SOLUTION INTRAMUSCULAR at 23:10

## 2024-05-04 RX ADMIN — FENTANYL CITRATE 100 MCG: 50 INJECTION INTRAMUSCULAR; INTRAVENOUS at 18:45

## 2024-05-04 RX ADMIN — FENTANYL CITRATE 100 MCG: 50 INJECTION INTRAMUSCULAR; INTRAVENOUS at 22:29

## 2024-05-04 RX ADMIN — Medication 2 MILLI-UNITS/MIN: at 11:09

## 2024-05-04 RX ADMIN — LIDOCAINE HYDROCHLORIDE 20 ML: 10 INJECTION, SOLUTION EPIDURAL; INFILTRATION; INTRACAUDAL; PERINEURAL at 22:30

## 2024-05-04 RX ADMIN — MISOPROSTOL 25 MCG: 100 TABLET ORAL at 03:12

## 2024-05-04 RX ADMIN — HYDROXYZINE HYDROCHLORIDE 100 MG: 50 TABLET, FILM COATED ORAL at 02:06

## 2024-05-04 RX ADMIN — SODIUM CHLORIDE, POTASSIUM CHLORIDE, SODIUM LACTATE AND CALCIUM CHLORIDE: 600; 310; 30; 20 INJECTION, SOLUTION INTRAVENOUS at 18:56

## 2024-05-04 RX ADMIN — SODIUM CHLORIDE, POTASSIUM CHLORIDE, SODIUM LACTATE AND CALCIUM CHLORIDE: 600; 310; 30; 20 INJECTION, SOLUTION INTRAVENOUS at 11:09

## 2024-05-04 RX ADMIN — FENTANYL CITRATE 100 MCG: 50 INJECTION INTRAMUSCULAR; INTRAVENOUS at 20:50

## 2024-05-04 ASSESSMENT — ACTIVITIES OF DAILY LIVING (ADL)
ADLS_ACUITY_SCORE: 18

## 2024-05-04 NOTE — PROGRESS NOTES
"Blood pressure 105/61, pulse 75, temperature 98.6  F (37  C), temperature source Oral, resp. rate 18, height 1.65 m (5' 4.96\"), last menstrual period 08/15/2023.  Patient Vitals for the past 24 hrs:   BP Temp Temp src Pulse Resp Height   05/04/24 0525 105/61 98.6  F (37  C) Oral -- 18 --   05/04/24 0210 106/63 98.2  F (36.8  C) Oral -- 18 --   05/04/24 0005 114/66 98.2  F (36.8  C) Oral -- 16 --   05/03/24 2046 109/65 98.8  F (37.1  C) Oral -- 20 --   05/03/24 1631 106/62 98.4  F (36.9  C) Oral 75 20 1.65 m (5' 4.96\")     General appearance: uncomfortable with contractions   per Ipad  IOL for ICP with bile acids 17. Not itching now.  Pt dosing throughout noc. Had one dose of vaginal miso and porter that came out at 0530a.     CONTACTIONS: every 2-4 minutes, moderate, and cramping  Pitocin- none,  Antibiotics- none  FETAL HEART TONES: continuous EFM- baseline 145 with moderate variability and positive accelerations. No decelerations.  ROM: not ruptured  PELVIC EXAM:deferred    Results for orders placed or performed during the hospital encounter of 05/03/24   Treponema Abs w Reflex to RPR and Titer     Status: Normal   Result Value Ref Range    Treponema Antibody Total Nonreactive Nonreactive   Asymptomatic COVID-19 Virus (Coronavirus) by PCR Nose     Status: Normal    Specimen: Nose; Swab   Result Value Ref Range    SARS CoV2 PCR Negative Negative    Narrative    Testing was performed using the Xpert Xpress SARS-CoV-2 Assay on the Cepheid Gene-Xpert Instrument Systems. Additional information about this Emergency Use Authorization (EUA) assay can be found via the Lab Guide. This test should be ordered for the detection of SARS-CoV-2 in individuals who meet SARS-CoV-2 clinical and/or epidemiological criteria as well as from individuals without symptoms or other reasons to suspect COVID-19. Test performance for asymptomatic patients has only been established in anterior nasal swab specimens. This test is for " in vitro diagnostic use under the FDA EUA for laboratories certified under CLIA to perform high complexity testing. This test has not been FDA cleared or approved. A negative result does not rule out the presence of PCR inhibitors in the specimen or target RNA concentration below the limit of detection for the assay. The possibility of a false negative should be considered if the patient's recent exposure or clinical presentation suggests COVID-19. This test was validated by the [facility]. This laboratory is certified under the Clinical Laboratory Improvement Amendments (CLIA) as qualified to perform high complexity laboratory testing.     CBC with platelets     Status: Abnormal   Result Value Ref Range    WBC Count 5.6 4.0 - 11.0 10e3/uL    RBC Count 3.40 (L) 3.80 - 5.20 10e6/uL    Hemoglobin 10.6 (L) 11.7 - 15.7 g/dL    Hematocrit 31.5 (L) 35.0 - 47.0 %    MCV 93 78 - 100 fL    MCH 31.2 26.5 - 33.0 pg    MCHC 33.7 31.5 - 36.5 g/dL    RDW 12.5 10.0 - 15.0 %    Platelet Count 178 150 - 450 10e3/uL   Adult Type and Screen     Status: None   Result Value Ref Range    ABO/RH(D) O POS     Antibody Screen Negative Negative    SPECIMEN EXPIRATION DATE [id number]    ABO/Rh type and screen     Status: None    Narrative    The following orders were created for panel order ABO/Rh type and screen.  Procedure                               Abnormality         Status                     ---------                               -----------         ------                     Adult Type and Screen[[id number]]                            Final result                 Please view results for these tests on the individual orders.       ASSESSMENT:  ==============  IUP @ 38w4d for induction of labor.  Indication: ICP   Fetal Heart Rate Tracing category one  GBS- negative  Patient Active Problem List   Diagnosis    High-risk pregnancy    Anemia in pregnancy    Vitamin D deficiency    Cholestasis  during pregnancy in third trimester    Labor and delivery, indication for care     PLAN:  ===========  Ambulation, hydration, position changes, birthing ball/sling and tub options to facilitate labor.  Pain medication- knows options for pain medication. Will let us know if she would like anything  Reevaluate in 2 hours prn  Continue labor induction with Pitocin if contactions space out. Pt agreeable to plan  JARRETT EscobarM

## 2024-05-04 NOTE — PROGRESS NOTES
"  SUBJECTIVE:  ==============  Ama HASSAN Amarilis Kingsley reports the porter balloon came out when she got up and went to the bathroom. Noting bloody show  Declines pain medication at this time  General appearance: Mild discomfort with contractions    Support: Servando is in the chair sleeping by her side      OBJECTIVE:  ==============  VITALS  Blood pressure 106/63, pulse 75, temperature 98.6  F (37  C), temperature source Oral, resp. rate 18, height 1.65 m (5' 4.96\"), last menstrual period 08/15/2023.    FETAL HEART RATE ASSESSMENT:  Baseline rate 135, normal  Variability moderate  Accelerations present   Decelerations not present       CONTRACTIONS: Contractions every 3 minutes.  Palpate: moderate  Pitocin- none,  Antibiotics- none    ROM: not ruptured  PELVIC EXAM:PELVIC EXAM: 4/ 60%/ posterior/ average/ -2 Forrester score 6  # Pain Assessment:      2024     5:30 AM   Current Pain Score   Patient currently in pain? yes   - Ama is experiencing pain due to contractions. Pain management was discussed and the plan was created in a collaborative fashion.  Ama's response to the current recommendations: engaged  - declines pain medications at this time      Assessment: EFM interpretation suggests absence of concern for fetal metabolic acidemia at this time due to accelerations present, heart rate: normal baseline, and variability: moderate      Labor course:  5/3/24  1745: SVE closed, thick, -2 / posterior. PO Miso ordered  1823 PO Miso #1   PO Miso #2  24  0200 SVE 2cm/60/-2/posterior/moderate. Porter placed, filled to 75mL  0206 Vistaril 100mg  0312 PV Miso 1  0526 Porter out SVE 4cm/60%/-2/posterior/mod Forrester 6    ASSESSMENT:  ==============  Ama Garciaelliott Kingsley  29 year old  female  Estimated Date of Delivery: May 14, 2024  IUP @ 38w4d IOL for cholestasis   in early labor   Fetal Heart Rate Tracing category one over the last 120 minutes  GBS- negative  negative COVID PCR test    Patient Active " Problem List   Diagnosis    High-risk pregnancy    Anemia in pregnancy    Vitamin D deficiency    Cholestasis during pregnancy in third trimester    Labor and delivery, indication for care          PLAN:  ===========  -Reviewed SVE and excellent progress  -Reviewed pain medication options of Nitrous Oxide, Fentanyl IV and epidural reviewed with pt. Ama declines pain medication at this time.  -Ambulation, hydration, position changes, birthing ball/sling and tub options to facilitate labor.  -Reviewed that we are unable to give her any medications for promote labor until after 712 due to vaginal misoprostol administration  -encouraged rest now  -Will give report to on coming JAMEY Parisi, JARRETT, JAMEY

## 2024-05-04 NOTE — PLAN OF CARE
VSS, afebrile. See flowsheets for EFM and TOCO monitoring. Patient coping with pain by walking and hot packs. Patient was able to get some rest overnight. Vg miso x1 given, porter bulb out. Plan to reassess 4 hours after last vg miso. Report given to RENATO Navarro.

## 2024-05-04 NOTE — PROGRESS NOTES
"Blood pressure 102/58, pulse 59, temperature 98  F (36.7  C), temperature source Oral, resp. rate 18, height 1.65 m (5' 4.96\"), last menstrual period 08/15/2023.  Patient Vitals for the past 24 hrs:   BP Temp Temp src Pulse Resp Height   05/04/24 0807 102/58 98  F (36.7  C) Oral 59 18 --   05/04/24 0525 105/61 98.6  F (37  C) Oral -- 18 --   05/04/24 0210 106/63 98.2  F (36.8  C) Oral -- 18 --   05/04/24 0005 114/66 98.2  F (36.8  C) Oral -- 16 --   05/03/24 2046 109/65 98.8  F (37.1  C) Oral -- 20 --   05/03/24 1631 106/62 98.4  F (36.9  C) Oral 75 20 1.65 m (5' 4.96\")     General appearance: comfortable  Resting. Contractions spaced out. Plan starting pitocin after eating and shower.  CONTACTIONS: every 2-5 minutes and mild  Pitocin- none,  Antibiotics- none  FETAL HEART TONES: continuous EFM- baseline 135 with moderate variability and positive accelerations. No decelerations.  ROM: not ruptured  PELVIC EXAM:deferred    ASSESSMENT:  ==============  IUP @ 38w4d for induction of labor.  Indication: ICP   Fetal Heart Rate Tracing category one  GBS- negative  Patient Active Problem List   Diagnosis    High-risk pregnancy    Anemia in pregnancy    Vitamin D deficiency    Cholestasis during pregnancy in third trimester    Labor and delivery, indication for care     PLAN:  ===========  Ambulation, hydration, position changes, birthing ball/sling and tub options to facilitate labor.  Pain medication- prn  Continue labor induction with Pitocin   JARRETT Escobar CNM    "

## 2024-05-04 NOTE — PROGRESS NOTES
"  SUBJECTIVE:  ==============  Ama SONYA Kingsley has been able to rest on and off since arrival. She is open to another SVE and possible placement of a Ceballos balloon  General appearance: comfortable    Support: alone. Her cousin is now not able to come because she has to work. Servando, the FOB (but they are not longer together), will come and offer support in the am      OBJECTIVE:  ==============  VITALS  Blood pressure 106/63, pulse 75, temperature 98.2  F (36.8  C), temperature source Oral, resp. rate 18, height 1.65 m (5' 4.96\"), last menstrual period 08/15/2023.    FETAL HEART RATE ASSESSMENT:  Baseline rate 135, normal  Variability moderate  Accelerations present   Decelerations not present       CONTRACTIONS: Contractions every 2-4 minutes.  Palpate: mild  Pitocin- none,  Antibiotics- none    ROM: not ruptured  PELVIC EXAM:PELVIC EXAM: 2/ 60%/ Posterior/ average/ -2 Forrester 5    # Pain Assessment:      2024     2:10 AM   Current Pain Score   Patient currently in pain? yes   Ama keene pain level was assessed and she currently denies pain.    Interested in Vistaril      Assessment: EFM interpretation suggests absence of concern for fetal metabolic acidemia at this time due to accelerations present, heart rate: normal baseline, and variability: moderate    Labor course:    5/3/24  1745: SVE closed, thick, -2 / posterior. PO Miso ordered  1823 PO Miso #1   PO Miso #2  24  0200 SVE 2cm/60/-2/posterior/moderate. Ceballos placed, filled to 75mL      ASSESSMENT:  ==============  Ama SONYA Kingsley  29 year old  female  Estimated Date of Delivery: May 14, 2024  IUP @ 38w4d IOL for cholestasis   Not in labor  Fetal Heart Rate Tracing category one over the last 120 minutes  GBS- negative  Negative COVID PCR test    Patient Active Problem List   Diagnosis    High-risk pregnancy    Anemia in pregnancy    Vitamin D deficiency    Cholestasis during pregnancy in third trimester    Labor and delivery, " indication for care          PLAN:  ===========  -Encouraged Ama to utilize Vistaril and Morphine as needed to help her sleep  -Ambulation, hydration, position changes, birthing ball/sling and tub options to facilitate labor.  Pain medication options of Nitrous Oxide, Fentanyl IV and epidural reviewed with pt. Pt is interested in nitrous oxide, open to hearing options  -Cervical ripening with vaginal Misoprostol and cervical porter bulb risks and benefits reviewed with pt. Agreeable to plan.   -Anticipate progress and NSVB.   -Reevaluate progress in 2-3 hours or sooner with a change in status.    Addendum: 0500 RN reports Porter remains in place, Ama is resting on and off. Cat 1 FHT    JARRETT Velez, CNM

## 2024-05-04 NOTE — PLAN OF CARE
Data: Patient admitted to room 473 at 1600. Patient is a . Prenatal record reviewed.   OB History    Para Term  AB Living   3 2 2 0 0 2   SAB IAB Ectopic Multiple Live Births   0 0 0 0 2      # Outcome Date GA Lbr Mino/2nd Weight Sex Type Anes PTL Lv   3 Current            2 Term      Vag-Spont   KAYKAY   1 Term      Vag-Spont   KAYKAY   .  Medical History: History reviewed. No pertinent past medical history..  Gestational age 38w3d. Vital signs per doc flowsheet. Fetal movement present. Patient reports Induction Of Labor   as reason for admission. Support persons cousin present but had to leave, she will return tomorrow or sooner if needed.  Action: Verbal consent for EFM, external fetal monitors applied. Admission assessment completed. Patient educated on labor process. Patient instructed to report any concerns related to the pregnancy to her nurse/physician. Patient oriented to room, call light in reach.  present for admission process.  Response: JAVIER Randall CNM informed of arrival. Plan per provider is miso. Patient verbalized understanding of education and verbalized agreement with plan.     SL started and miso given po. Pt coping well. Monitoring shows moderate variability with accels, no decels. Contractions have increased to every 2-3 minutes and becoming more uncomfortable. Using warm packs and position changes, tolerating well.

## 2024-05-04 NOTE — PROGRESS NOTES
"Blood pressure 122/67, pulse 74, temperature 98  F (36.7  C), temperature source Oral, resp. rate 18, height 1.65 m (5' 4.96\"), last menstrual period 08/15/2023.  Patient Vitals for the past 24 hrs:   BP Temp Temp src Pulse Resp Height   05/04/24 1109 122/67 98  F (36.7  C) Oral -- 18 --   05/04/24 1010 93/50 97.9  F (36.6  C) Oral 74 18 --   05/04/24 0807 102/58 98  F (36.7  C) Oral 59 18 --   05/04/24 0525 105/61 98.6  F (37  C) Oral -- 18 --   05/04/24 0210 106/63 98.2  F (36.8  C) Oral -- 18 --   05/04/24 0005 114/66 98.2  F (36.8  C) Oral -- 16 --   05/03/24 2046 109/65 98.8  F (37.1  C) Oral -- 20 --   05/03/24 1631 106/62 98.4  F (36.9  C) Oral 75 20 1.65 m (5' 4.96\")     In person   General appearance: comfortable, up walking in room  Feels contr are getting stronger, no bleeding  CONTACTIONS: every 4 minutes, moderate, cramping, and back pain  Pitocin- 2 mu/min.,  Antibiotics- none  FETAL HEART TONES: continuous EFM- baseline 150 with moderate variability and late deceleration x1, not repeated.  ROM: not ruptured  PELVIC EXAM:deferred    ASSESSMENT:  ==============  IUP @ 38w4d for induction of labor.  Indication: ICP   Fetal Heart Rate Tracing category two  GBS- negative  Patient Active Problem List   Diagnosis    High-risk pregnancy    Anemia in pregnancy    Vitamin D deficiency    Cholestasis during pregnancy in third trimester    Labor and delivery, indication for care     PLAN:  ===========  Close observation  Ambulation, hydration, position changes, birthing ball/sling and tub options to facilitate labor.  Continue labor induction with  Pitocin  Shannan Conn, APRN CNM    "

## 2024-05-04 NOTE — PROGRESS NOTES
"Blood pressure 105/61, pulse 78, temperature 98.5  F (36.9  C), temperature source Oral, resp. rate 18, height 1.65 m (5' 4.96\"), last menstrual period 08/15/2023.  Patient Vitals for the past 24 hrs:   BP Temp Temp src Pulse Resp Height   05/04/24 1314 105/61 98.5  F (36.9  C) Oral 78 18 --   05/04/24 1109 122/67 98  F (36.7  C) Oral -- 18 --   05/04/24 1010 93/50 97.9  F (36.6  C) Oral 74 18 --   05/04/24 0807 102/58 98  F (36.7  C) Oral 59 18 --   05/04/24 0525 105/61 98.6  F (37  C) Oral -- 18 --   05/04/24 0210 106/63 98.2  F (36.8  C) Oral -- 18 --   05/04/24 0005 114/66 98.2  F (36.8  C) Oral -- 16 --   05/03/24 2046 109/65 98.8  F (37.1  C) Oral -- 20 --   05/03/24 1631 106/62 98.4  F (36.9  C) Oral 75 20 1.65 m (5' 4.96\")     In person   General appearance: comfortable  Standing at bedside. Family here visiting. Feels contractions are about the same  CONTACTIONS: every 2-5 minutes, moderate, and cramping  Pitocin- 8 mu/min.,  Antibiotics- none  FETAL HEART TONES: continuous EFM- baseline 145 with moderate variability and positive accelerations. No decelerations.  ROM: not ruptured  PELVIC EXAM:pt declined    ASSESSMENT:  ==============  IUP @ 38w4d for induction of labor.  Indication: cholestasis   Fetal Heart Rate Tracing category one  GBS- negative  Patient Active Problem List   Diagnosis    High-risk pregnancy    Anemia in pregnancy    Vitamin D deficiency    Cholestasis during pregnancy in third trimester    Labor and delivery, indication for care     PLAN:  ===========  Discussed SVE and evaluation for AROM to facilitate labor. Pt declines and would like to continue with pitocin IOL  Ambulation, hydration, position changes, birthing ball/sling and tub options to facilitate labor.  Pain medication- prn  Continue labor induction with Pitocin, titrate to contr.  Shannan Conn, APRN CNM    "

## 2024-05-05 LAB — HGB BLD-MCNC: 10.2 G/DL (ref 11.7–15.7)

## 2024-05-05 PROCEDURE — 120N000002 HC R&B MED SURG/OB UMMC

## 2024-05-05 PROCEDURE — 36415 COLL VENOUS BLD VENIPUNCTURE: CPT | Performed by: ADVANCED PRACTICE MIDWIFE

## 2024-05-05 PROCEDURE — 85018 HEMOGLOBIN: CPT | Performed by: ADVANCED PRACTICE MIDWIFE

## 2024-05-05 PROCEDURE — 722N000001 HC LABOR CARE VAGINAL DELIVERY SINGLE

## 2024-05-05 PROCEDURE — 250N000013 HC RX MED GY IP 250 OP 250 PS 637: Performed by: ADVANCED PRACTICE MIDWIFE

## 2024-05-05 RX ORDER — ACETAMINOPHEN 325 MG/1
650 TABLET ORAL EVERY 4 HOURS PRN
Qty: 60 TABLET | Refills: 1 | Status: SHIPPED | OUTPATIENT
Start: 2024-05-05

## 2024-05-05 RX ORDER — AMOXICILLIN 250 MG
1 CAPSULE ORAL 2 TIMES DAILY PRN
Qty: 60 TABLET | Refills: 1 | Status: SHIPPED | OUTPATIENT
Start: 2024-05-05

## 2024-05-05 RX ORDER — IBUPROFEN 600 MG/1
600 TABLET, FILM COATED ORAL EVERY 6 HOURS PRN
Qty: 60 TABLET | Refills: 1 | Status: SHIPPED | OUTPATIENT
Start: 2024-05-05

## 2024-05-05 RX ADMIN — IBUPROFEN 800 MG: 800 TABLET, FILM COATED ORAL at 11:42

## 2024-05-05 RX ADMIN — ACETAMINOPHEN 650 MG: 325 TABLET, FILM COATED ORAL at 21:18

## 2024-05-05 RX ADMIN — ACETAMINOPHEN 650 MG: 325 TABLET, FILM COATED ORAL at 15:53

## 2024-05-05 RX ADMIN — DOCUSATE SODIUM 100 MG: 100 CAPSULE, LIQUID FILLED ORAL at 11:44

## 2024-05-05 RX ADMIN — IBUPROFEN 800 MG: 800 TABLET, FILM COATED ORAL at 05:12

## 2024-05-05 RX ADMIN — DOCUSATE SODIUM 100 MG: 100 CAPSULE, LIQUID FILLED ORAL at 21:18

## 2024-05-05 RX ADMIN — IBUPROFEN 800 MG: 800 TABLET, FILM COATED ORAL at 18:04

## 2024-05-05 ASSESSMENT — ACTIVITIES OF DAILY LIVING (ADL)
ADLS_ACUITY_SCORE: 22
ADLS_ACUITY_SCORE: 19
ADLS_ACUITY_SCORE: 22
ADLS_ACUITY_SCORE: 19
ADLS_ACUITY_SCORE: 22
ADLS_ACUITY_SCORE: 19
ADLS_ACUITY_SCORE: 19
ADLS_ACUITY_SCORE: 22
ADLS_ACUITY_SCORE: 19
ADLS_ACUITY_SCORE: 22
ADLS_ACUITY_SCORE: 22
ADLS_ACUITY_SCORE: 19
ADLS_ACUITY_SCORE: 18
ADLS_ACUITY_SCORE: 19
ADLS_ACUITY_SCORE: 22
ADLS_ACUITY_SCORE: 19
ADLS_ACUITY_SCORE: 22

## 2024-05-05 NOTE — L&D DELIVERY NOTE
Delivery Summary  DELIVERY NOTE:  Brief Labor Course: [facility] pt, IOL for cholestasis. Bile acids 17, ALT and AST wnl. GBS neg. Given oral miso, vaginal miso and porter catheter for cervical ripening. Pitocin started after porter catheter came out. Minimal cervical change over next several hours. Pt agreeable to AROM clear fluid and began having strong painful contractions after that. IV fentanyl for pain medication x2. Noted to have variable decelerations at 9cm with involuntary pushing. Pushed well to crown. NICU called for IV fentanyl one hour before birth  Delivery Note:    viable male with loose CAN reduced and nuchal hand. Placed on moms abd and spont lusty cry. NICU dismissed. IV with pitocin opened after delivery of baby. Perineum inspected noted deep second to anal sphincter and Dr Gautam akins to evaluate for possible third degree laceration. After inspection, Dr Florez agreed with deep second degree laceration and did repair with Dr Chaparro.  A reinforcing stitch with 2-0 vicryl placed over capsule and second degree laceration repaired with 3-0 vicryl over 1% lidocaine. See their note. Qbl pending, EBL 250cc. Mom and baby stable    IUP at 38 weeks gestation delivered on May 4, 2024.     delivery of a viable Male infant.  Weight : 8 pounds 13 ounces   Apgars of 9 at 1 minute and 9 at 5 minutes.  Labor was induced.  Medications administered  in labor:  Pain Rx Fentanyl; Antibiotics No; Other   Perineum: 2nd degree  Placenta-mechanism: spontaneous, intact,  IV oxytocin was given After delivery of baby  Quantitative Blood Loss was pending. EBL 250cc.  Complications of labor and delivery: Dysfunctional Labor, Nuchal cord, and Nuchal hand  Anticipated Discharge Date: 24  Birth attendants: Shannan Conn, JARRETT HASSAN Amarilis Teetee MRN# [id number]   Age: 29 year old YOB: 1995     ASSESSMENT & PLAN:        Ernie Valera-Ama [[id number]]       Labor Events     labor?: No   steroids: None  Labor Type: Induction/Cervical ripening  Predominate monitoring during 1st stage: continuous electronic fetal monitoring     Antibiotics received during labor?: No     Rupture identifier: Sac 1  Rupture date/time: 24 2100   Rupture type: Artificial Rupture of Membranes  Fluid color: Clear  Fluid odor: Normal     Induction: Misoprostol, Mechanical ripening agent  Induction date/time:      Cervical ripening date/time:      Indications for induction: Other Pregnant Patient Indications (Comment to specify)     Augmentation: Oxytocin, AROM  Indications for augmentation: Ineffective Contraction Pattern       Delivery/Placenta Date and Time      Delivery Date: 24 Delivery Time: 10:00 PM   Placenta Date/Time: 2024 10:12 PM  Oxytocin given at the time of delivery: after delivery of baby  Delivering clinician: Shannan Marshall APRN CNM   Other personnel present at delivery:  Provider Role   Estefania Farris, RN Registered Nurse   Nydia Whipple RN Registered Nurse             Vaginal Counts       Initial count performed by 2 team members:  Two Team Members   Estefania Marshall cnm         Needles Suture Needles Sponges (RETIRED) Instruments   Initial counts 2  5    Added to count  2     Relief counts       Final counts 2 2 5            Placed during labor Accounted for at the end of labor   FSE     IUPC     Cervidil                               Apgars    Living status: Living   1 Minute 5 Minute 10 Minute 15 Minute 20 Minute   Skin color: 1  1       Heart rate: 2  2       Reflex irritability: 2  2       Muscle tone: 2  2       Respiratory effort: 2  2       Total: 9  9              Cord      Cord Complications: Nuchal   Nuchal Intervention: reduced         Nuchal cord description: loose nuchal cord         Cord Blood Disposition: Lab    Gases Sent?: No    Delayed cord clamping?: Yes    Cord Clamping Delay  (seconds):  seconds    Stem cell collection?: No            Resuscitation    Methods: None  [place] Care at Delivery: Called to attend the delivery due to maternal opioid administration during labor. Infant delivered with spontaneous cry and respirations. NICU team not needed and dismissed.     JARRETT Valiente, NNP-BC, May 4, 2024 10:04 PM   Advanced Practice Providers  [facility]           [place] Measurements      Weight: 8 lb 13 oz           Labor Events and Shoulder Dystocia    Fetal Tracing Prior to Delivery: Category 2  Shoulder dystocia present?: Neg       Delivery (Maternal) (Provider to Complete) (851285)    Episiotomy: None  Perineal lacerations: 2nd Repaired?: Yes   Repair suture: 2-0 Vicryl, 3-0 Vicryl  Number of repair packets: 2  Genital tract inspection done: Pos       Blood Loss  Mother: Amarilis Kingsley Ama J #[id number]     Start of Mother's Information      Delivery Blood Loss  24 1000 - 24 2304      None                 End of Mother's Information  Mother: Amarilis Ama Kingsley #[id number]                Delivery - Provider to Complete (430071)    Delivering clinician: Shannan Marshall APRN CNM  Delivery Type (Choose the 1 that will go to the Birth History): Vaginal, Spontaneous                         Other personnel:  Provider Role   Estefania Farris, RN Registered Nurse   Nydia Whipple RN Registered Nurse                    Placenta    Date/Time: 2024 10:12 PM  Removal: Spontaneous  Disposition: Hospital disposal             Anesthesia    Method: INTRAVENOUS      Analgesic:  BIRTH HISTORY: ANALGESIC   FENTANYL (OPIOID AGONISTS)                 Presentation and Position    Presentation: Vertex     Occiput Anterior                     JARRETT Escobar CNM

## 2024-05-05 NOTE — PLAN OF CARE
IV Pitocin  infusing, patient states is coping with contractions, VSS, see flow sheet for FHR and contraction pattern.  Will continue to monitor and will notify provider is there is a change in status. Anticipate .  Goal Outcome Evaluation:      Plan of Care Reviewed With: patient, friend    Overall Patient Progress: improving

## 2024-05-05 NOTE — CONSULTS
"OB Consult Note    Contacted by CNM to assess Ama Kingsley for perineal laceration after .     O:  /68   Pulse 65   Temp 98.8  F (37.1  C)   Resp 18   Ht 1.65 m (5' 4.96\")   LMP 08/15/2023     : Deep second degree perineal tear with intact anal sphincter. Deep reinforcing suture placed using 2-0 Vicryl. Second degree repaired with 3-0 Vicryl. Good hemostasis. Rectal exam with intact anal sphincter and no evidence of occult 4th degree laceration or sutures within the rectum.    A/P: 29 year old yo  at 38w4d s/p PPD#0 s/p . Consulted by CNM service for evaluation of perineal laceration repaired as above. No further intervention required.    Discussed and seen with Dr. Florez.    Rene Chaparro MD, MPH  Ob/Gyn Resident, PGY-3  24 10:40 PM     I participated in all aspects of Ama Kingsley's perineal repair with Dr. Chaparro on 2024 and agree with the details in this note with edits by me.     Joslyn Florez MD    "

## 2024-05-05 NOTE — LACTATION NOTE
This note was copied from a baby's chart.  Consult for:  3rd baby, mom's provider request support for Ama with latching baby.  In person  present throughout visit, mom's provider working with them prior to lactation and RN came in working with same  after LC visit.     Infant Name:     Infant's Primary Care Clinic: [facility]    Delivery Information:  Vaginal delivery at 38w4d on 2024, 10:00 PM. AGA @ 8# 13 ounce birthweight (near 90th, 89.54 percentile). Diaper output excellent thus far, just 24 hours old at time of visit.     Maternal Health History:    Information for the patient's mother:  Ama Valera [[id number]]     Patient Active Problem List   Diagnosis    High-risk pregnancy    Anemia in pregnancy    Vitamin D deficiency    Cholestasis during pregnancy in third trimester     (normal spontaneous vaginal delivery)    Second degree perineal laceration     and   Information for the patient's mother:  Ama Valera [[id number]]     Medications Prior to Admission   Medication Sig Dispense Refill Last Dose    ferrous sulfate (FEROSUL) 325 (65 Fe) MG tablet Take 325 mg by mouth daily (with breakfast)       Prenatal Vit-Fe Fumarate-FA (PRENATAL MULTIVITAMIN  PLUS IRON) 27-1 MG TABS Take 1 tablet by mouth daily             Maternal Breast Exam:  Breasts are soft and symmetrical with bilateral intact, everted nipples. We did not discuss hand expression this visit. ?    Breastfeeding/ Lactation History: Ama  her first two children for one year each, reports it went well and she had great milk supply. This is her first delivery in [facility].     Oral exam of baby:  Normal jaw and palate, good length of tongue palpable beyond lingual frenulum attachment, extends beyond lower gum ridge & organized when sucking on finger.     Feeding History: Breastfeeding well, minimal assist though she  well with two other children who are now 6 and 10 years  old.     Feeding Assessment:  Baby brought to mom, she helped him latch then winced with shallow latch. With permission, broke seal and shared ways to shape areola prior to latching and aim lower areola to lower lip, tucking nipple in roof of mouth to get deeper latch. It took a few tries to get him on deeper but when she did, noted improved comfort and deeper jaw pulls, audible swallowing that mom could also hear.     Education:   [x] Expected  feeding patterns in the first few days (pg. 38 of Your Guide to To Postpartum and  Care)/ the Second Night  [x] Stages of milk production  [] Benefits of hand expression of colostrum  [] Early feeding cues     [] Benefits of feeding on cue  [] Benefits of skin to skin  [x] Breastfeeding positions  [x] Tips to get and maintain a deep latch  [x] Nutritive vs.non-nutritive sucking  [x] Gentle breast compressions as needed to enhance milk transfer  [x] How to tell when baby is finished  [x] How to tell if baby is getting enough  [x] Expected  output  [] [place] weight loss  [x] Infant Feeding Log in [place], brought feedings and diapers up to date thus far.   [] [employer] Breastfeeding/Pumping videos  [x] Signs breastfeeding is going well (comfortable latch, audible swallows, age appropriate output and weight loss)    [x] Tips to prevent engorgement  [x] Signs of engorgement  [x] Tips to manage engorgement  [] Pumping recommendations (based on patient need)  [] CDC breast pump part/infant feeding supplies cleaning recommendations  [x] Inpatient breastfeeding support  [x] Outpatient lactation resources    Handouts: Infant Feeding Log (Week 1, Your Guide to Postpartum &  Care Book) and [facility] Lactation Resources    Home Breast Pump: did not ask    Plan: Continue breastfeeding on cue with RN support as needed, goal of 8-12 feedings per day.     Encourage frequent skin to skin and hand expression.     Follow up with outpatient lactation  consultant  as needed after discharge though experienced mom with history of  well two previous children and great milk supply. Family plans to follow up with [facility] clinic, if they did want further support shared location options and central scheduling phone number.        Petra Ovalles RN, IBCLC   Lactation Consultant  [place]: [facility] [phone]  Office: [phone]

## 2024-05-05 NOTE — PROGRESS NOTES
"Blood pressure 124/74, pulse 65, temperature 98.2  F (36.8  C), temperature source Oral, resp. rate 18, height 1.65 m (5' 4.96\"), last menstrual period 08/15/2023.  Patient Vitals for the past 24 hrs:   BP Temp Temp src Pulse Resp   05/04/24 1920 124/74 -- -- -- --   05/04/24 1808 125/71 98.2  F (36.8  C) Oral -- 18   05/04/24 1708 119/76 98.5  F (36.9  C) Oral 65 18   05/04/24 1608 121/80 98.2  F (36.8  C) Oral 57 18   05/04/24 1508 108/61 98.5  F (36.9  C) Oral 67 18   05/04/24 1407 101/65 -- -- 70 18   05/04/24 1314 105/61 98.5  F (36.9  C) Oral 78 18   05/04/24 1109 122/67 98  F (36.7  C) Oral -- 18   05/04/24 1010 93/50 97.9  F (36.6  C) Oral 74 18   05/04/24 0807 102/58 98  F (36.7  C) Oral 59 18   05/04/24 0525 105/61 98.6  F (37  C) Oral -- 18   05/04/24 0210 106/63 98.2  F (36.8  C) Oral -- 18   05/04/24 0005 114/66 98.2  F (36.8  C) Oral -- 16   05/03/24 2046 109/65 98.8  F (37.1  C) Oral -- 20     General appearance: uncomfortable with contractions   per phone  Pt had some relief from IV fentanyl. Feeling pain and pressure with cont. No involuntary pushing  Discussed AROM to facilitate labor. Pt will consider  CONTACTIONS: every 2-5 minutes, moderate, and cramping  Pitocin- 18 mu/min.,  Antibiotics- none  FETAL HEART TONES: continuous EFM- baseline 150 with moderate variability and positive accelerations. Intermittent early decelerations.  ROM: not ruptured  PELVIC EXAM:deferred    ASSESSMENT:  ==============  IUP @ 38w4d for induction of labor.  Indication: ICP   Fetal Heart Rate Tracing category one  GBS- negative  Patient Active Problem List   Diagnosis    High-risk pregnancy    Anemia in pregnancy    Vitamin D deficiency    Cholestasis during pregnancy in third trimester    Labor and delivery, indication for care     PLAN:  ===========  Close observation for second stage.  Ambulation, hydration, position changes, birthing ball/sling and tub options to facilitate labor.   Continue with " julianne Conn, JARRETT HOOKSM

## 2024-05-05 NOTE — DISCHARGE SUMMARY
[facility] Discharge Summary    Ama Kingsley MRN# [id number]   Age: 29 year old YOB: 1995     Date of Admission:  5/3/2024  Date of Discharge::  24 may go home 24 am  Admitting Midwife:  JARRETT Rod CNM  Discharge Midwife:  Ruby Parisi CNM      Home clinic: [facility]           Admission Diagnoses:   Maternity*willa  24/IOL  IOL for Intrahepatic Cholestasis of pregnancy          Discharge Diagnosis:     Induced vaginal delivery secondary to Intrahepatic cholestasis of pregnancy  Intrauterine pregnancy at 38/4 weeks gestation  Second degree perineal laceration          Procedures:     Procedure(s):   Repair of second degree perineal laceration       No procedures performed during this admission           Medications Prior to Admission:     Medications Prior to Admission   Medication Sig Dispense Refill Last Dose    ferrous sulfate (FEROSUL) 325 (65 Fe) MG tablet Take 325 mg by mouth daily (with breakfast)       Prenatal Vit-Fe Fumarate-FA (PRENATAL MULTIVITAMIN  PLUS IRON) 27-1 MG TABS Take 1 tablet by mouth daily                Discharge Medications:     Current Discharge Medication List        START taking these medications    Details   acetaminophen (TYLENOL) 325 MG tablet Take 2 tablets (650 mg) by mouth every 4 hours as needed for mild pain or fever  Qty: 60 tablet, Refills: 1    Associated Diagnoses:  (normal spontaneous vaginal delivery)      cholecalciferol (VITAMIN D3) 125 mcg (5000 units) capsule Take 1 capsule (125 mcg) by mouth daily Take one capsule daily.  Qty: 90 capsule, Refills: 3    Associated Diagnoses:  (normal spontaneous vaginal delivery)      ibuprofen (ADVIL/MOTRIN) 600 MG tablet Take 1 tablet (600 mg) by mouth every 6 hours as needed for other (cramping)  Qty: 60 tablet, Refills: 1    Associated Diagnoses:  (normal spontaneous vaginal delivery)      senna-docusate  (SENOKOT-S/PERICOLACE) 8.6-50 MG tablet Take 1 tablet by mouth 2 times daily as needed for constipation  Qty: 60 tablet, Refills: 1    Associated Diagnoses:  (normal spontaneous vaginal delivery)           CONTINUE these medications which have NOT CHANGED    Details   ferrous sulfate (FEROSUL) 325 (65 Fe) MG tablet Take 325 mg by mouth daily (with breakfast)      Prenatal Vit-Fe Fumarate-FA (PRENATAL MULTIVITAMIN  PLUS IRON) 27-1 MG TABS Take 1 tablet by mouth daily                   Consultations:   OB MD team consulted to evaluate perineal laceration          Brief History of Labor:   Delivery Summary  DELIVERY NOTE:  Brief Labor Course: [facility] pt, IOL for cholestasis. Bile acids 17, ALT and AST wnl. GBS neg. Given oral miso, vaginal miso and porter catheter for cervical ripening. Pitocin started after porter catheter came out. Minimal cervical change over next several hours. Pt agreeable to AROM clear fluid and began having strong painful contractions after that. IV fentanyl for pain medication x2. Noted to have variable decelerations at 9cm with involuntary pushing. Pushed well to crown. NICU called for IV fentanyl one hour before birth  Delivery Note:    viable male with loose CAN reduced and nuchal hand. Placed on moms abd and spont lusty cry. NICU dismissed. IV with pitocin opened after delivery of baby. Perineum inspected noted deep second to anal sphincter and Dr Gautam akins to evaluate for possible third degree laceration. After inspection, Dr Florez agreed with deep second degree laceration and did repair with Dr Chaparro.  A reinforcing stitch with 2-0 vicryl placed over capsule and second degree laceration repaired with 3-0 vicryl over 1% lidocaine. See their note. Qbl pending, EBL 250cc. Mom and baby stable     IUP at 38 weeks gestation delivered on May 4, 2024.     delivery of a viable Male infant.  Weight : 8 pounds 13 ounces   Apgars of 9 at 1 minute and 9 at 5 minutes.  Labor was  induced.  Medications administered  in labor:  Pain Rx Fentanyl; Antibiotics No; Other   Perineum: 2nd degree  Placenta-mechanism: spontaneous, intact,  IV oxytocin was given After delivery of baby  Quantitative Blood Loss was pending. EBL 250cc.  Complications of labor and delivery: Dysfunctional Labor, Nuchal cord, and Nuchal hand  Anticipated Discharge Date: 5/6/24  Birth attendants: JARRETT Peña CNM, CNM      Assessment Day of Discharge    Patient Vitals for the past 24 hrs:   BP Temp Temp src Pulse Resp   05/05/24 0900 93/52 98.4  F (36.9  C) Oral 63 18   05/05/24 0457 108/64 98.5  F (36.9  C) Oral 70 16   05/05/24 0038 117/68 98.8  F (37.1  C) Oral 58 16   05/05/24 0017 99/54 -- -- -- --   05/05/24 0015 97/52 -- -- -- --   05/05/24 0000 105/55 -- -- -- --   05/04/24 2345 127/60 -- -- -- --   05/04/24 2330 124/56 -- -- -- --   05/04/24 2315 116/59 -- -- -- --   05/04/24 2251 108/58 98.7  F (37.1  C) Oral -- --   05/04/24 2231 123/68 -- -- -- --   05/04/24 2213 124/66 -- -- -- --   05/04/24 2100 -- 98.8  F (37.1  C) -- -- --   05/04/24 2021 128/70 -- -- -- --   05/04/24 1920 124/74 -- -- -- --   05/04/24 1808 125/71 98.2  F (36.8  C) Oral -- 18   05/04/24 1708 119/76 98.5  F (36.9  C) Oral 65 18   05/04/24 1608 121/80 98.2  F (36.8  C) Oral 57 18   05/04/24 1508 108/61 98.5  F (36.9  C) Oral 67 18   05/04/24 1407 101/65 -- -- 70 18   05/04/24 1314 105/61 98.5  F (36.9  C) Oral 78 18       Pt stable, baby is rooming in  Breast feeding status: initiated  Complications since 2 hours post delivery: None  Patient is tolerating acitivity well Voiding without difficulty, cramping is relieved by Ibuprophen, lochia is decreasing and patient denies clots.  Perineal pain is is relieved by Ibuprophen.   Healing laceration is sore   Breasts:soft, filling  Nipples:erect, no lesions, intact, demonstrated hand expression, ample colostrum  Abdomen: soft, nontender, fundus firm, umb/-1,  midline  Perineum:  laceration is well approximated, healing well, approximated, no edema, erythema, bruising, hematoma or s/s of infection  Lochia: min rubra, no clots, no odor  Legs: nontender, trace edema           Hospital Course:     Ama Kingsley feels ready for discharge. Reviewed birth experience. She is surprised it took as long as it did!   She is up and active in the room independently, is voiding without difficulty. Ambulating without dizziness or calf pain. Tolerating normal diet and passing flatus. No BM. Voiding without issue.  Ama has declined pain medications and is noting tenderness from her second degree perineal laceration.   Denies HA, visual changes, RUQ pain  LOCHIA: Bleeding is light without clots.  INFANT FEEDING: Baby Thor Luu is feeding on cue. Breastfeeding with the assistance of the nursing staff.   PP CONTRACEPTION: plans include Nexplanon.   PP SUPPORT:  Ama Kingsley will have 6 weeks off from work;   Servando, the FOB, will offer financial support of diapers and groceries, but they are not romanitcally involved.   Ama's neighbor and older daughter will help her with the baby     MOOD ASSESSMENT: She reports no history of depression/anxiety.   COMPLICATIONS SINCE GIVING BIRTH: none    Recent Labs   Lab 24  0838 24  1715   HGB 10.2* 10.6*        ASSESSMENT/PLAN:  30 yo   Day 1 postpartum, NSVB 24 at 2200  s/p 2nd degree laceration repaired.  Vital signs stable   Breastfeeding   Denies history of PP depression or anxiety  Planning Nexplanon for pp contraception  Complications:anemic, plan for iron supplementation  Pt prefers discharge today after 24 hours but knows this will be late at night. Open to staying if there is a medical indication, otherwise she would like to get home to her other children, who are being taken care of by her neighbor. May decide it is too late and go home tomorrow am   RTC 2 and 6 weeks  Teaching done: D/C  Instructions: Nutrition/Activity, Engorgement Management, Birth Control Options, Warning Signs/When to Call: Excessive Bleeding, Infection, PP Depression, Kegals and Crunches, RTC Clinic for PP Appointment, PNV, and Iron supplemenation    Recommended cold compress, ibuprofen and tylenol now for perineal pain  Needs a pump before she is discharged  Postpartum warning s/s reviewed, including bleeding/clots, fever, mastitis, or depression/anxiety           Discharge Instructions and Follow-Up:     Discharge diet: Regular   Discharge activity: Activity as tolerated   Discharge follow-up: Follow up with [facility] service in two weeks for a phone visit and in two weeks for an in person postpartum exam   Wound care: Warm baths to promote circulation and healing           Discharge Disposition:     Discharged to home        Ruby Parisi CNM

## 2024-05-05 NOTE — PLAN OF CARE
"Goal Outcome Evaluation:      Plan of Care Reviewed With: patient    Overall Patient Progress: improvingOverall Patient Progress: improving    Outcome Evaluation: Postpartum vaginal delivery from 24 with 2nd degree laceration with repair, ice packs and tucks pads in use. Pain well controlled at this time. Vital signs stable, uterus firm at midline and scant rubra lochia. Breastfeeding  with minimal assistance. Voiding spontaneously with no difficulties. Postpartum tiana-care reviewed with patient. Plan of care reviewed with patient; she states understanding and agrees with plan of care.      Problem: Adult Inpatient Plan of Care  Goal: Plan of Care Review  Description: The Plan of Care Review/Shift note should be completed every shift.  The Outcome Evaluation is a brief statement about your assessment that the patient is improving, declining, or no change.  This information will be displayed automatically on your shift  note.  Outcome: Progressing  Flowsheets (Taken 2024)  Outcome Evaluation:   Postpartum vaginal delivery from 24 with 2nd degree laceration with repair, ice packs and tucks pads in use. Pain well controlled at this time. Vital signs stable, uterus firm at midline and scant rubra lochia. Breastfeeding  with minimal assistance. Voiding spontaneously with no difficulties. Postpartum tiana-care reviewed with patient. Plan of care reviewed with patient   she states understanding and agrees with plan of care.  Plan of Care Reviewed With: patient  Overall Patient Progress: improving  Goal: Patient-Specific Goal (Individualized)  Description: You can add care plan individualizations to a care plan. Examples of Individualization might be:  \"Parent requests to be called daily at 9am for status\", \"I have a hard time hearing out of my right ear\", or \"Do not touch me to wake me up as it startles  me\".  Outcome: Progressing  Flowsheets (Taken 2024)  Individualized Care Needs: "  needed, likes water with no ice  Anxieties, Fears or Concerns: Pain control, breastfeeding  Patient/Family-Specific Goals (Include Timeframe): Pain level to be 0 out of 10 after interventions prior to discharge expected on 24. Independently breastfeed  prior to discharge expected 24.  Goal: Absence of Hospital-Acquired Illness or Injury  Outcome: Progressing  Intervention: Prevent Skin Injury  Recent Flowsheet Documentation  Taken 2024 by Brittanie Mcrae RN  Body Position: position changed independently  Intervention: Prevent Infection  Recent Flowsheet Documentation  Taken 2024 by Brittanie Mcrae RN  Infection Prevention:   cohorting utilized   environmental surveillance performed   equipment surfaces disinfected   hand hygiene promoted   personal protective equipment utilized   rest/sleep promoted   visitors restricted/screened  Goal: Optimal Comfort and Wellbeing  Outcome: Progressing  Intervention: Provide Person-Centered Care  Recent Flowsheet Documentation  Taken 2024 by Brittanie Mcrae RN  Trust Relationship/Rapport:   care explained   choices provided   questions answered   questions encouraged   reassurance provided   thoughts/feelings acknowledged  Goal: Readiness for Transition of Care  Outcome: Progressing     Problem: Postpartum (Vaginal Delivery)  Goal: Successful Parent Role Transition  Outcome: Progressing  Goal: Hemostasis  Outcome: Progressing  Goal: Absence of Infection Signs and Symptoms  Outcome: Progressing  Intervention: Prevent or Manage Infection  Recent Flowsheet Documentation  Taken 2024 by Brittanie Mcrae RN  Infection Management: aseptic technique maintained  Goal: Anesthesia/Sedation Recovery  Outcome: Progressing  Goal: Optimal Pain Control and Function  Outcome: Progressing  Intervention: Prevent or Manage Pain  Recent Flowsheet Documentation  Taken 2024 by Brittanie Mcrae RN  Perineal  Care:   absorbent brief/pad changed   perineum cleansed  Goal: Effective Urinary Elimination  Outcome: Progressing     Brittanie Mcrae RN on 5/5/2024 at 2:28 AM

## 2024-05-05 NOTE — PLAN OF CARE
VSS. Fundus midline, firm, and at umbillicus. Lochia light. Minimal pain noted while feeding, using warm backs. Taking ibuprofen for pain.. Voiding without difficulty. Breastfeeding independently with good latch. Bonding well with . Continue with plan of care.

## 2024-05-05 NOTE — PLAN OF CARE
Problem: Labor  Goal: Effective Progression to Delivery  Outcome: Met   Goal Outcome Evaluation:     of viable Male with Wenceslao Marshall in attendance. NICU  present due to close administration of fentanyl to delivery. Infant with spontaneous cry to mothers abdomen, dried and stimulated. Apgars 9,9. Placenta delivered without complications, pitocin bolused, 2nd degree laceration, MD team consulted for repair- repairs done. Michelle cares provided. Mother and baby in stable condition.

## 2024-05-05 NOTE — PROGRESS NOTES
"Blood pressure 124/74, pulse 65, temperature 98.2  F (36.8  C), temperature source Oral, resp. rate 18, height 1.65 m (5' 4.96\"), last menstrual period 08/15/2023.  Patient Vitals for the past 24 hrs:   BP Temp Temp src Pulse Resp   05/04/24 1920 124/74 -- -- -- --   05/04/24 1808 125/71 98.2  F (36.8  C) Oral -- 18   05/04/24 1708 119/76 98.5  F (36.9  C) Oral 65 18   05/04/24 1608 121/80 98.2  F (36.8  C) Oral 57 18   05/04/24 1508 108/61 98.5  F (36.9  C) Oral 67 18   05/04/24 1407 101/65 -- -- 70 18   05/04/24 1314 105/61 98.5  F (36.9  C) Oral 78 18   05/04/24 1109 122/67 98  F (36.7  C) Oral -- 18   05/04/24 1010 93/50 97.9  F (36.6  C) Oral 74 18   05/04/24 0807 102/58 98  F (36.7  C) Oral 59 18   05/04/24 0525 105/61 98.6  F (37  C) Oral -- 18   05/04/24 0210 106/63 98.2  F (36.8  C) Oral -- 18   05/04/24 0005 114/66 98.2  F (36.8  C) Oral -- 16     General appearance: uncomfortable with contractions   per phoone  Reviewed risk/ benefit of AROM. Pt would like repeat fentanyl IV first. SVE 6/80/-1. AROM clear fluid  CONTACTIONS: every 2-4 minutes, moderate, and cramping  Pitocin- 20 mu/min.,  Antibiotics- none  FETAL HEART TONES: continuous EFM- baseline 155 with moderate variability and positive accelerations. No decelerations.  AROM: clear fluid  PELVIC EXAM:6/80/-1    ASSESSMENT:  ==============  IUP @ 38w4d for induction of labor.  Indication: ICP   Fetal Heart Rate Tracing category one  GBS- negative  Patient Active Problem List   Diagnosis    High-risk pregnancy    Anemia in pregnancy    Vitamin D deficiency    Cholestasis during pregnancy in third trimester    Labor and delivery, indication for care     PLAN:  ===========  Close observation for second stage  Continue labor induction with Pitocin   JARRETT Escobar CNM    "

## 2024-05-05 NOTE — PROGRESS NOTES
Data: Ama Kingsley transferred to [facility] via wheelchair at 1230. Baby transferred via parent's arms.  Action: Receiving unit notified of transfer: Yes. Patient and family notified of room change. Report given to RENATO Townsend at 0000. Belongings sent to receiving unit. Accompanied by Registered Nurse. Oriented patient to surroundings. Call light within reach. ID bands double-checked with receiving RN.  Response: Patient tolerated transfer and is stable.

## 2024-05-05 NOTE — DISCHARGE INSTRUCTIONS
Warning Signs after Having a Baby    Keep this paper on your fridge or somewhere else where you can see it.    Call your provider if you have any of these symptoms up to 12 weeks after having your baby.    Thoughts of hurting yourself or your baby  Pain in your chest or trouble breathing  Severe headache not helped by pain medicine  Eyesight concerns (blurry vision, seeing spots or flashes of light, other changes to eyesight)  Fainting, shaking or other signs of a seizure    Call 9-1-1 if you feel that it is an emergency.     The symptoms below can happen to anyone after giving birth. They can be very serious. Call your provider if you have any of these warning signs.    My provider s phone number: _______________________    Losing too much blood (hemorrhage)    Call your provider if you soak through a pad in less than an hour or pass blood clots bigger than a golf ball. These may be signs that you are bleeding too much.    Blood clots in the legs or lungs    After you give birth, your body naturally clots its blood to help prevent blood loss. Sometimes this increased clotting can happen in other areas of the body, like the legs or lungs. This can block your blood flow and be very dangerous.     Call your provider if you:  Have a red, swollen spot on the back of your leg that is warm or painful when you touch it.   Are coughing up blood.     Infection    Call your provider if you have any of these symptoms:  Fever of 100.4 F (38 C) or higher.  Pain or redness around your stitches if you had an incision.   Any yellow, white, or green fluid coming from places where you had stitches or surgery.    Mood Problems (postpartum depression)    Many people feel sad or have mood changes after having a baby. But for some people, these mood swings are worse.     Call your provider right away if you feel so anxious or nervous that you can't care for yourself or your baby.    Preeclampsia (high blood pressure)    Even if you  "didn't have high blood pressure when you were pregnant, you are at risk for the high blood pressure disease called preeclampsia. This risk can last up to 12 weeks after giving birth.     Call your provider if you have:   Pain on your right side under your rib cage  Sudden swelling in the hands and face    Remember: You know your body. If something doesn't feel right, get medical help.     For informational purposes only. Not to replace the advice of your health care provider. Copyright 2020 [facility]. All rights reserved. Clinically reviewed by Aggie Kamara, RNC-OB, MSN. [employer] [id number] - Rev 02/23.  El período posparto: Instrucciones de cuidado-Instrucciones de cuidado  Postpartum: Care Instructions  Instrucciones de cuidado  Después del parto (período posparto), silverio cuerpo pasa por muchos cambios. Algunos de estos cambios suceden whitney varias semanas. Whitney las horas posteriores al parto, el cuerpo comienza a recuperarse y se prepara para el amamantamiento. Es posible que whitney audelia tiempo se sienta sensible. Las hormonas pueden cambiar silverio estado de ánimo sin advertencia y sin motivo aparente.  Whitney las primeras semanas después del parto, muchas mujeres tienen emociones que cambian de tiwari a stefania. Es posible que le resulte difícil dormir. Es posible que llore mucho. [place] se llama \"melancolía de la maternidad\". Estas emociones abrumadoras suelen desaparecer dentro de unos días o semanas. Bonny es importante hablar con silverio médico acerca de pete sentimientos.  Whitney las primeras semanas después del parto, es fácil cansarse demasiado o sentirse abrumada. No lavern demasiados esfuerzos. Descanse cada vez que pueda, acepte la ayuda de los demás, coma lauri y liv abundantes líquidos.  En el primer par de semanas después de ashanti a marta, es posible que silverio médico o partera deseen verla y hacer un plan para cualquier atención de seguimiento que usted pueda necesitar. Probablemente tendrá caesar ramez " completa de posparto dentro de los primeros 3 meses después del parto. En dariel momento, silverio médico o partera revisarán silverio recuperación del parto. Él o kendra también verán cómo está enfrentando usted pete emociones y conversarán sobre pete inquietudes y preguntas.  La atención de seguimiento es caesar parte clave de silverio tratamiento y seguridad. Asegúrese de hacer y acudir a todas las citas, y llame a silverio médico si está teniendo problemas. También es caesar buena idea saber los resultados de pete exámenes y mantener caesar lista de los medicamentos que emily.   Cómo puede cuidarse en el hogar?  Duerma o descanse cuando silverio bebé lo lavern.  Si es posible, permita que pete familiares o pete amigos la ayuden con las tareas del hogar. No intente hacerlo todo usted crystal.  Si tiene hemorroides o hinchazón o dolor alrededor de la abertura de la vagina, pruebe aplicarse frío y calor. Puede aplicarse hielo o caesar compresa fría en la parveen whitney 10 a 20 minutos cada vez. Póngase un paño jacobs entre el hielo y la piel. Además, trate de sentarse en algunos centímetros de agua tibia (baño de asiento) 3 veces al día y después de las evacuaciones.  Onley los analgésicos (medicamentos para el dolor) exactamente según las indicaciones.  Si el médico le recetó un analgésico, tómelo según las indicaciones.  Si no está tomando un analgésico recetado, pregúntele a silverio médico si puede mckenna jarrod de venta karon.  Coma más fibra para evitar el estreñimiento. Incluya alimentos betty panes y cereales integrales, verduras crudas, frutas crudas y secas, y frijoles (habichuelas).  Emily mucho líquido. Si tiene caesar enfermedad renal, cardíaca o hepática y tiene que restringir los líquidos, hable con silverio médico antes de aumentar la cantidad de líquido que kristie.  No se lave el interior de la vagina con líquidos (lavados vaginales).  Si tiene puntos de sutura, mantenga la parveen limpia con agua tibia, ya sea echándola o rociándola sobre la parveen externa de la vagina y el ano  después de usar el baño.  Lleve caesar lista de preguntas para hacerle a silverio médico o partera. Josey preguntas podrían ser acerca de lo siguiente:  Cambios en los senos, betty bultos o sensibilidad.  Cuándo esperar que regrese silverio período menstrual.  Qué forma de anticoncepción es la más adecuada para usted.  El peso que aumentó whitney el embarazo.  Las opciones de ejercicio.  Qué alimentos y bebidas son mejores para usted, sobre todo si está amamantando.  Problemas que podría tener con la lactancia.  Cuándo puede tener relaciones sexuales. Algunas mujeres iris vez quieran hablar sobre lubricantes vaginales.  Cualquier sentimiento de tristeza o inquietud que tenga.   Cuándo debe pedir ayuda?  Comparta esta información con silverio jorge luis, silverio kahlil o caesar amiga. Pueden ayudarla a prestar atención a las señales de advertencia.  Llame al 911  en cualquier momento que considere que necesita atención de urgencia. Por ejemplo, llame si:    Tiene pensamientos de lastimarse o de hacerle daño a silverio bebé o a otra persona.     Se desmayó (perdió el conocimiento).     Tiene dolor en el pecho, le falta el aire o tose vinayak.     Tiene convulsiones.   Dónde obtener ayuda las 24 horas del día, los 7 días de la semana   Si usted o alguien que conoce habla de suicidio, autolesionarse, caesar crisis de charlette mental, caesar crisis por consumo de sustancias o cualquier otro tipo de malestar psíquico, obtenga ayuda de inmediato. Usted puede:    Marcar [phone] para llamar a la línea de prevención del suicidio y crisis.     Llamar al [phone] ([phone]).     Enviar un mensaje de texto que diga HOME al [phone] para acceder a la línea de mensajes de texto en casos de crisis.   Considere guardar estos números en silverio teléfono.  Visite [employer] para obtener más información o conversar en línea.  Llame a silverio médico ahora mismo o busque atención médica de inmediato si:    Tiene señales de hemorragia (sangrado excesivo), betty:  Sangrado vaginal intenso.  [place] significa que está empapando caesar o más toallas sanitarias en caesar hora. O expulsa coágulos de vinayak más grandes que un huevo.  Se siente mareada o aturdida, o siente betty si se fuera a desmayar.  Se siente tan cansada o débil que no puede hacer pete actividades habituales.  Latidos cardíacos acelerados o irregulares.  Dolor abdominal nuevo o más intenso.     Tiene señales de infección, tales betty:  Fiebre.  Micción frecuente o dolorosa o vinayak en la orina.  Secreción vaginal con olor desagradable.  Dolor abdominal nuevo o más intenso.     Tiene síntomas de un coágulo de vinayak en la pierna (llamado trombosis venosa profunda), tales betty:  Dolor en la pantorrilla, la parte posterior de la rodilla, el muslo o la renuka.  Hinchazón en la pierna o la renuka.  Un cambio de color en la pierna o la renuka. La piel podría estar enrojecida o morada, según cuál sea silverio color de piel normal.     Tiene señales de preeclampsia, tales betty:  Hinchazón repentina de la leslye, las oswaldo o los pies.  Nuevos problemas de la vista (betty oscurecimiento, loraine borroso o loraine puntos).  Dolor de em intenso.     Tiene señales de insuficiencia cardíaca, tales betty:  Nueva o mayor falta de aire.  Nueva o mayor hinchazón en las piernas, los tobillos o los pies.  Aumento repentino de peso, betty más de 2 o 3 libras (0.9 kg o 1.4 kg) en un día o 5 libras (2.3 kg) en caesar semana.  Se siente tan cansada o débil que no puede hacer pete actividades habituales.     Se sometió a un alivio del dolor raquídeo o epidural y tiene:  Dolor de espalda nuevo o peor.  Aumento del dolor, la hinchazón, la temperatura o el enrojecimiento en el lugar de la inyección.  Hormigueo, debilidad o entumecimiento en las piernas o la renuka.   Preste especial atención a los cambios en silverio charlette y asegúrese de comunicarse con silverio médico si:    El sangrado vaginal no disminuye.     Siente tristeza, ansiedad o desesperanza whitney más de algunos días.     Tiene problemas con los  "senos o el amamantamiento.    Dónde puede encontrar más información en inglés?  Vaya a [email or web address].[email or web address]  Escriba Z768 en la búsqueda para aprender más acerca de \"El período posparto: Instrucciones de cuidado-Instrucciones de cuidado.\"  Revisado: 10 macie, 2023               Versión del contenido: 14.0    [id number] [employer].   Las instrucciones de cuidado fueron adaptadas bajo licencia por silverio profesional de atención médica. Si usted tiene preguntas sobre caesar afección médica o sobre estas instrucciones, siempre pregunte a silverio profesional de charlette. [employer], [employer] nie[employer] toda garantía o responsabilidad por silverio uso de esta información.        "

## 2024-05-06 ENCOUNTER — APPOINTMENT (OUTPATIENT)
Dept: INTERPRETER SERVICES | Facility: CLINIC | Age: 29
End: 2024-05-06
Payer: COMMERCIAL

## 2024-05-06 ENCOUNTER — VIRTUAL VISIT (OUTPATIENT)
Dept: INTERPRETER SERVICES | Facility: CLINIC | Age: 29
End: 2024-05-06
Payer: COMMERCIAL

## 2024-05-06 VITALS
TEMPERATURE: 98.3 F | HEIGHT: 65 IN | SYSTOLIC BLOOD PRESSURE: 105 MMHG | BODY MASS INDEX: 26.09 KG/M2 | DIASTOLIC BLOOD PRESSURE: 54 MMHG | HEART RATE: 84 BPM | RESPIRATION RATE: 16 BRPM | WEIGHT: 156.6 LBS

## 2024-05-06 PROCEDURE — T1013 SIGN LANG/ORAL INTERPRETER: HCPCS | Mod: U4,TEL,95

## 2024-05-06 PROCEDURE — 250N000013 HC RX MED GY IP 250 OP 250 PS 637: Performed by: ADVANCED PRACTICE MIDWIFE

## 2024-05-06 RX ADMIN — DOCUSATE SODIUM 100 MG: 100 CAPSULE, LIQUID FILLED ORAL at 08:22

## 2024-05-06 ASSESSMENT — ACTIVITIES OF DAILY LIVING (ADL)
ADLS_ACUITY_SCORE: 18
ADLS_ACUITY_SCORE: 19
ADLS_ACUITY_SCORE: 18
ADLS_ACUITY_SCORE: 19
ADLS_ACUITY_SCORE: 19
ADLS_ACUITY_SCORE: 18
ADLS_ACUITY_SCORE: 19
ADLS_ACUITY_SCORE: 18

## 2024-05-06 NOTE — DISCHARGE SUMMARY
[facility] Discharge Summary    Ama Kingsley MRN# [id number]   Age: 29 year old YOB: 1995     Date of Admission:  5/3/2024  Date of Discharge::  2024  Admitting Physician:  JARRETT Rod CNM  Discharge Physician:  JARRETT Lara CNM, CNM, MS      Home clinic: [facility]          Admission Diagnoses:   Maternity*willa  24/  Labor and delivery, indication for care          Discharge Diagnosis:     Normal spontaneous vaginal delivery  Intrauterine pregnancy at 38 weeks gestation          Procedures:     Procedure(s): Repair of second degree perineal laceration       No procedures performed during this admission           Medications Prior to Admission:     Medications Prior to Admission   Medication Sig Dispense Refill Last Dose    ferrous sulfate (FEROSUL) 325 (65 Fe) MG tablet Take 325 mg by mouth daily (with breakfast)       Prenatal Vit-Fe Fumarate-FA (PRENATAL MULTIVITAMIN  PLUS IRON) 27-1 MG TABS Take 1 tablet by mouth daily                Discharge Medications:     Current Discharge Medication List        START taking these medications    Details   acetaminophen (TYLENOL) 325 MG tablet Take 2 tablets (650 mg) by mouth every 4 hours as needed for mild pain or fever  Qty: 60 tablet, Refills: 1    Associated Diagnoses:  (normal spontaneous vaginal delivery)      cholecalciferol (VITAMIN D3) 125 mcg (5000 units) capsule Take 1 capsule (125 mcg) by mouth daily Take one capsule daily.  Qty: 90 capsule, Refills: 3    Associated Diagnoses:  (normal spontaneous vaginal delivery)      ibuprofen (ADVIL/MOTRIN) 600 MG tablet Take 1 tablet (600 mg) by mouth every 6 hours as needed for other (cramping)  Qty: 60 tablet, Refills: 1    Associated Diagnoses:  (normal spontaneous vaginal delivery)      senna-docusate (SENOKOT-S/PERICOLACE) 8.6-50 MG tablet Take 1 tablet by mouth 2 times daily as needed for constipation  Qty: 60 tablet,  Refills: 1    Associated Diagnoses:  (normal spontaneous vaginal delivery)           CONTINUE these medications which have NOT CHANGED    Details   ferrous sulfate (FEROSUL) 325 (65 Fe) MG tablet Take 325 mg by mouth daily (with breakfast)      Prenatal Vit-Fe Fumarate-FA (PRENATAL MULTIVITAMIN  PLUS IRON) 27-1 MG TABS Take 1 tablet by mouth daily                   Consultations:   No consultations were requested during this admission          Brief History of Labor:   Delivery Summary  DELIVERY NOTE:  Brief Labor Course: [facility] pt, IOL for cholestasis. Bile acids 17, ALT and AST wnl. GBS neg. Given oral miso, vaginal miso and porter catheter for cervical ripening. Pitocin started after porter catheter came out. Minimal cervical change over next several hours. Pt agreeable to AROM clear fluid and began having strong painful contractions after that. IV fentanyl for pain medication x2. Noted to have variable decelerations at 9cm with involuntary pushing. Pushed well to crown. NICU called for IV fentanyl one hour before birth  Delivery Note:    viable male with loose CAN reduced and nuchal hand. Placed on moms abd and spont lusty cry. NICU dismissed. IV with pitocin opened after delivery of baby. Perineum inspected noted deep second to anal sphincter and Dr Gautam akins to evaluate for possible third degree laceration. After inspection, Dr Florez agreed with deep second degree laceration and did repair with Dr Chaparro.  A reinforcing stitch with 2-0 vicryl placed over capsule and second degree laceration repaired with 3-0 vicryl over 1% lidocaine. See their note. Qbl pending, EBL 250cc. Mom and baby stable     IUP at 38 weeks gestation delivered on May 4, 2024.     delivery of a viable Male infant.  Weight : 8 pounds 13 ounces   Apgars of 9 at 1 minute and 9 at 5 minutes.  Labor was induced.  Medications administered  in labor:  Pain Rx Fentanyl; Antibiotics No; Other   Perineum: 2nd degree  Placenta-mechanism:  spontaneous, intact,  IV oxytocin was given After delivery of baby  Quantitative Blood Loss was pending. EBL 250cc.  Complications of labor and delivery: Dysfunctional Labor, Nuchal cord, and Nuchal hand  Anticipated Discharge Date: 5/6/24  Birth attendants: JARRETT Peña CNM, CNM           Assessment Day of Discharge    Pt stable, baby is rooming in  Breast feeding status:initiated and well established  Complications since 2 hours post delivery: None  Patient is tolerating acitivity well Voiding without difficulty, cramping is minimal, lochia is decreasing and patient denies clots.  Perineal pain is is relieved by Ibuprophen.    postpartum exam   Breasts:soft, filling  Nipples:erect, no lesions, intact  Abdomen: soft, nontender, fundus firm, umb/-1, midline  Perineum:  laceration is well approximated, healing well, approximated, no edema, erythema, bruising, hematoma or s/s of infection  Lochia: min rubra, no clots, no odor  Legs: nontender, trace edema    Patient Vitals for the past 24 hrs:   BP Temp Temp src Pulse Resp Weight   05/06/24 0820 105/54 98.3  F (36.8  C) Oral 84 16 --   05/06/24 0509 -- -- -- -- -- 71 kg (156 lb 9.6 oz)   05/05/24 2340 95/53 97.9  F (36.6  C) Oral -- 16 --   05/05/24 1600 109/65 98  F (36.7  C) Oral 66 14 --   05/05/24 1330 98/63 98.1  F (36.7  C) Oral 72 16 --                Hospital Course:   The patient's hospital course was unremarkable.  On discharge, her pain was well controlled. Vaginal bleeding is similar to peak menstrual flow.  Voiding without difficulty.  Ambulating well and tolerating a normal diet.  No fever.  Breastfeeding well.  Infant is stable.  No bowel movement yet.*  She was discharged on post-partum day #2.    Post-partum hemoglobin:   Hemoglobin   Date Value Ref Range Status   05/05/2024 10.2 (L) 11.7 - 15.7 g/dL Final      ASSESSMENT/PLAN:  Patient Active Problem List    Second degree perineal laceration         Priority:  Medium [2]         Date Noted: 2024       (normal spontaneous vaginal delivery)         Priority: Medium [2]         Date Noted: 2024      Cholestasis during pregnancy in third trimester         Priority: Medium [2]         Date Noted: 2024            Formatting of this note is different from the            original. Reviewed with Ama that intrahepatic            cholestasis of pregnancy occurs approximately            0.32%-5.6% and sometimes more often in certain            populations ([place] in [place], 27%). ICP            is more common with people who are older than 35            years and it tends to recur in subsequent            pregnancies.                          Many people notice itching on the palms of their            hands or soles of their feet, but some people also            note right upper quadrant pain, nausea, poor             appetite, sleep deprivation, or fatty stools.                           Recommended labs are liver function tests, total            bile acid, bilirubin direct and total. Diagnosis            is made with the presence of pruritis associated            with elevated bile acid levels, elevated            amniotransferases, or both. Severe cholestasis is            defined as bile acid level >40. It is recommended            to repeat these levels weekly if they are            initially normal.             [facility]?s Code: [id number]                          Maternal bile acids can cross the placenta and            accumulate in the fetus and amniotic fluid, which            carries risk for the baby (stillbirth,             birth, meconium stained fluid, NICU admission).            Risk of fetal demise increases with increasing            bile acid levels.                          Ursodiol can be taken 300mg 2-3 times a day            (15mg/kg per day). Can try             Calamine lotion, hydroxyzine at night.                            Recommendation is to deliver between 36-39 weeks            with bile acids <100. If bile acids are >100            consider 34-36 weeks. Twice weekly BPP recommended            after 32 weeks. Consider betamethasone if <37            weeks.      Anemia in pregnancy         Priority: Medium [2]         Date Noted: 03/06/2024            Formatting of this note might be different from            the original.            3/6/24 iron sent to pharmacy      Vitamin D deficiency         Priority: Medium [2]         Date Noted: 11/08/2023            Formatting of this note might be different from            the original.            3/6/24: 14      High-risk pregnancy         Priority: Medium [2]         Date Noted: 10/25/2023            Formatting of this note might be different from            the original.            [facility] CNM             Partner's name: not involved Servando - will offer            some financial support            [x] Entered on [facility] active OB patient list            [x] NOB folder            [x] First tri screen   declined            [x] QS/AFP declined            [x] Started ASA low risk             [x] Fetal anatomy US ordered            [X] Rubella immune            [x] Varicella Immune            [ ] Hep B Immune- #1 11/20            [ ] Pap POSTPARTUM                        [x] EOB folder            [x] FLU shot            [x] COVID vax            [x] GCT, passed            [NA] Rhogam if needed, date:            [NA] TOLAC consent done            [ ] Waterbirth declines,consent done            [ ] Breast pump msg sent to RN team            [x] Car seat             [x] TDAP given 3/6/24            [ ] PP Contraception plan: If tubal,consent date:            [ ] Labor plans: Brother may be able to come,            desires             [ ] : desires, msg sent!            [x] Infant feeding plan: breast            [x] Infant pediatrician: [facility]                        [x] GBS neg             [ ] OTC PP meds sent             [X] PP Support: The neighbor will help with            cooking and laundry after the             baby is born            Also one of her daughters is 10 and is able to            help with a lot of chores  Servando lives elsewhere            and provides financial support but they are not in            a             romantic relationship        Stable Post-partum day #2  Complications:anemic, plan for iron supplementation  Plan d/c home today  RTC 2 weeks  Teaching done: D/C Instructions: Nutrition/Activity, Engorgement Management, Birth Control Options, Warning Signs/When to Call: Excessive Bleeding, Infection, PP Depression, Kegals and Crunches, RTC Clinic for PP Appointment, PNV, and Iron supplemenation    Postpartum warning s/s reviewed, including bleeding/clots, fever, mastitis, or depression    Birthcontrol planned:None  Current Discharge Medication List        START taking these medications    Details   acetaminophen (TYLENOL) 325 MG tablet Take 2 tablets (650 mg) by mouth every 4 hours as needed for mild pain or fever  Qty: 60 tablet, Refills: 1    Associated Diagnoses:  (normal spontaneous vaginal delivery)      cholecalciferol (VITAMIN D3) 125 mcg (5000 units) capsule Take 1 capsule (125 mcg) by mouth daily Take one capsule daily.  Qty: 90 capsule, Refills: 3    Associated Diagnoses:  (normal spontaneous vaginal delivery)      ibuprofen (ADVIL/MOTRIN) 600 MG tablet Take 1 tablet (600 mg) by mouth every 6 hours as needed for other (cramping)  Qty: 60 tablet, Refills: 1    Associated Diagnoses:  (normal spontaneous vaginal delivery)      senna-docusate (SENOKOT-S/PERICOLACE) 8.6-50 MG tablet Take 1 tablet by mouth 2 times daily as needed for constipation  Qty: 60 tablet, Refills: 1    Associated Diagnoses:  (normal spontaneous vaginal delivery)           CONTINUE these medications which have NOT CHANGED    Details   ferrous sulfate (FEROSUL) 325 (65 Fe) MG  tablet Take 325 mg by mouth daily (with breakfast)      Prenatal Vit-Fe Fumarate-FA (PRENATAL MULTIVITAMIN  PLUS IRON) 27-1 MG TABS Take 1 tablet by mouth daily                  Discharge Instructions and Follow-Up:     Discharge diet: Regular   Discharge activity: Pelvic rest: abstain from intercourse and do not use tampons for 6 week(s)   Discharge follow-up: In 2 weeks-either RN phone or in person visit if desires, then 6-8 wks CNCANDACE   Wound care: Tub soaks           Discharge Disposition:     Discharged to home        JARRETT Lara CNM

## 2024-05-06 NOTE — PLAN OF CARE
Data: Vital signs stable, postpartum assessments within normal limits.   Eating and drinking without nausea or vomiting.  Up ad ayaka, and voiding without difficulty. Passing gas/BM.  Feeding baby independently-  breastfeeding  Is aware that ibuprofen and tylenol are available; has declined wanting or needing any pain meds. Pt states she is comfortable.  Perineum appears to be healing well, no s/s infection.  Discharge outcomes on care plan met.   Action: Review of care plan, teaching, and discharge instructions done. Discharge medications provided, Pt verified they are correct/matched name, verbalized understanding of administration instructions.  Response: Patient states understanding and comfort with her discharge instructions, discharge medications, and plan of care. All questions addressed. She will discharge home.

## 2024-05-06 NOTE — PLAN OF CARE
VSS. Fundal checks and bleeding WNL. Pain well controlled with ibuprofen and tylenol. Unable to have a bowel movement today, colace given. Independent with infant cares, however, did need encouragement to feed baby given it had been 4+ hours and baby was still sleeping. Baby temp assessed given baby appeared to be possibly over dressed and found to be 99.1. Encouraged parents to leave hat off given baby had multiple layers on. Mother did breast feed about 5 hours from previous feed, writer unable to assess given patient stated baby was only interested in eating from one side and then went back to sleep. Bath and birth certificate completed this shift using phone . Report to RENATO Steel at 1275.

## 2024-05-06 NOTE — PLAN OF CARE
"Goal Outcome Evaluation:      Plan of Care Reviewed With: patient, spouse    Overall Patient Progress: improvingOverall Patient Progress: improving    VSS and postpartum assessments WDL.  Up ad ayaka with steady gait and independent with cares.  Bonding well with infant.  Breastfeeding on cue independently. Pt denies pain.  Spouse present and supportive.  Will continue with postpartum cares and education per plan of care.       Problem: Adult Inpatient Plan of Care  Goal: Plan of Care Review  Description: The Plan of Care Review/Shift note should be completed every shift.  The Outcome Evaluation is a brief statement about your assessment that the patient is improving, declining, or no change.  This information will be displayed automatically on your shift  note.  Outcome: Progressing  Flowsheets (Taken 5/6/2024 0548)  Plan of Care Reviewed With:   patient   spouse  Overall Patient Progress: improving  Goal: Patient-Specific Goal (Individualized)  Description: You can add care plan individualizations to a care plan. Examples of Individualization might be:  \"Parent requests to be called daily at 9am for status\", \"I have a hard time hearing out of my right ear\", or \"Do not touch me to wake me up as it startles  me\".  Outcome: Progressing  Goal: Absence of Hospital-Acquired Illness or Injury  Outcome: Progressing  Intervention: Prevent Skin Injury  Recent Flowsheet Documentation  Taken 5/5/2024 2340 by Milvia Warner, RN  Body Position: position changed independently  Intervention: Prevent and Manage VTE (Venous Thromboembolism) Risk  Recent Flowsheet Documentation  Taken 5/5/2024 2340 by Milvia Warner, RN  VTE Prevention/Management: (ambulating) --  Intervention: Prevent Infection  Recent Flowsheet Documentation  Taken 5/5/2024 2340 by Milvia Warner, RN  Infection Prevention:   hand hygiene promoted   single patient room provided   equipment surfaces disinfected  Goal: Optimal Comfort and " Wellbeing  Outcome: Progressing  Intervention: Provide Person-Centered Care  Recent Flowsheet Documentation  Taken 5/5/2024 2340 by Milvia Warner, RN  Trust Relationship/Rapport:   care explained   choices provided   thoughts/feelings acknowledged   questions answered   questions encouraged   reassurance provided  Goal: Readiness for Transition of Care  Outcome: Progressing     Problem: Postpartum (Vaginal Delivery)  Goal: Successful Parent Role Transition  Outcome: Progressing  Intervention: Support Parent Role Transition  Recent Flowsheet Documentation  Taken 5/5/2024 2340 by Milvia Warner, RN  Supportive Measures:   active listening utilized   decision-making supported   goal-setting facilitated   self-care encouraged   verbalization of feelings encouraged  Parent-Child Attachment Promotion:   caring behavior modeled   rooming-in promoted   skin-to-skin contact encouraged   cue recognition promoted   face-to-face positioning promoted   interaction encouraged   parent/caregiver presence encouraged   participation in care promoted   positive reinforcement provided   strengths emphasized  Goal: Hemostasis  Outcome: Progressing  Goal: Absence of Infection Signs and Symptoms  Outcome: Progressing  Intervention: Prevent or Manage Infection  Recent Flowsheet Documentation  Taken 5/5/2024 2340 by Milvia Warner, RN  Infection Management: aseptic technique maintained  Goal: Anesthesia/Sedation Recovery  Outcome: Progressing  Goal: Optimal Pain Control and Function  Outcome: Progressing  Goal: Effective Urinary Elimination  Outcome: Progressing

## 2024-06-24 ENCOUNTER — LAB REQUISITION (OUTPATIENT)
Dept: LAB | Facility: CLINIC | Age: 29
End: 2024-06-24
Payer: COMMERCIAL

## 2024-06-24 PROCEDURE — 87624 HPV HI-RISK TYP POOLED RSLT: CPT | Performed by: ADVANCED PRACTICE MIDWIFE

## 2024-06-24 PROCEDURE — G0145 SCR C/V CYTO,THINLAYER,RESCR: HCPCS | Mod: ORL | Performed by: MIDWIFE

## 2024-07-02 DIAGNOSIS — O28.2 ASCUS (ATYPICAL SQUAMOUS CELLS OF UNDETERMINED SIGNIFICANCE) ON GYNECOLOGIC PAPANICOLAOU SMEAR COMPLICATING PREGNANCY, ANTEPARTUM: Primary | ICD-10-CM

## 2024-07-02 DIAGNOSIS — Z12.4 CERVICAL CANCER SCREENING: ICD-10-CM

## 2024-07-02 DIAGNOSIS — R87.619 ASCUS (ATYPICAL SQUAMOUS CELLS OF UNDETERMINED SIGNIFICANCE) ON GYNECOLOGIC PAPANICOLAOU SMEAR COMPLICATING PREGNANCY, ANTEPARTUM: Primary | ICD-10-CM

## 2024-07-02 LAB
BKR LAB AP GYN ADEQUACY: ABNORMAL
BKR LAB AP GYN INTERPRETATION: ABNORMAL
BKR LAB AP HPV REFLEX: NO
BKR LAB AP LMP: ABNORMAL
BKR LAB AP PREVIOUS ABNL DX: ABNORMAL
BKR LAB AP PREVIOUS ABNORMAL: ABNORMAL
PATH REPORT.COMMENTS IMP SPEC: ABNORMAL
PATH REPORT.COMMENTS IMP SPEC: ABNORMAL
PATH REPORT.RELEVANT HX SPEC: ABNORMAL

## 2024-07-02 PROCEDURE — 88141 CYTOPATH C/V INTERPRET: CPT

## 2024-07-03 ENCOUNTER — TELEPHONE (OUTPATIENT)
Dept: OBGYN | Facility: CLINIC | Age: 29
End: 2024-07-03
Payer: COMMERCIAL

## 2024-07-03 PROBLEM — R87.610 ASCUS WITH POSITIVE HIGH RISK HPV CERVICAL: Status: ACTIVE | Noted: 2024-07-02

## 2024-07-03 PROBLEM — R87.810 ASCUS WITH POSITIVE HIGH RISK HPV CERVICAL: Status: ACTIVE | Noted: 2024-07-02

## 2024-07-03 LAB
HPV HR 12 DNA CVX QL NAA+PROBE: POSITIVE
HPV16 DNA CVX QL NAA+PROBE: NEGATIVE
HPV18 DNA CVX QL NAA+PROBE: NEGATIVE
HUMAN PAPILLOMA VIRUS FINAL DIAGNOSIS: ABNORMAL

## 2024-07-03 NOTE — TELEPHONE ENCOUNTER
[facility] Call Center    Phone Message    May a detailed message be left on voicemail: no     Reason for Call: Other: Nurse Mónica called with questions for Ruby Parisi regarding this pt. Mónica is only working until 2:45 and then will be gone until Monday so please contact asap.     Action Taken: Message routed to:  Other: [facility]    Travel Screening: Not Applicable     Date of Service:

## 2024-07-05 NOTE — TELEPHONE ENCOUNTER
Please see note attached to this patients HPV results. This patient is seen at the [facility] clinic and will be followed there.

## 2024-08-22 ENCOUNTER — LAB REQUISITION (OUTPATIENT)
Dept: LAB | Facility: CLINIC | Age: 29
End: 2024-08-22
Payer: COMMERCIAL

## 2024-08-22 DIAGNOSIS — R87.810 CERVICAL HIGH RISK HUMAN PAPILLOMAVIRUS (HPV) DNA TEST POSITIVE: ICD-10-CM

## 2024-08-22 DIAGNOSIS — R87.610 ATYPICAL SQUAMOUS CELLS OF UNDETERMINED SIGNIFICANCE ON CYTOLOGIC SMEAR OF CERVIX (ASC-US): ICD-10-CM

## 2024-08-22 PROCEDURE — 88305 TISSUE EXAM BY PATHOLOGIST: CPT | Mod: 26 | Performed by: STUDENT IN AN ORGANIZED HEALTH CARE EDUCATION/TRAINING PROGRAM

## 2024-08-22 PROCEDURE — 88305 TISSUE EXAM BY PATHOLOGIST: CPT | Mod: TC,ORL | Performed by: FAMILY MEDICINE

## 2024-08-26 LAB
PATH REPORT.COMMENTS IMP SPEC: NORMAL
PATH REPORT.FINAL DX SPEC: NORMAL
PATH REPORT.GROSS SPEC: NORMAL
PATH REPORT.MICROSCOPIC SPEC OTHER STN: NORMAL
PATH REPORT.RELEVANT HX SPEC: NORMAL
PHOTO IMAGE: NORMAL

## 2025-07-17 ENCOUNTER — TRANSCRIBE ORDERS (OUTPATIENT)
Dept: URGENT CARE | Facility: CLINIC | Age: 30
End: 2025-07-17
Payer: COMMERCIAL

## 2025-07-17 DIAGNOSIS — M79.602 LEFT ARM PAIN: Primary | ICD-10-CM

## 2025-08-07 ENCOUNTER — LAB REQUISITION (OUTPATIENT)
Dept: LAB | Facility: CLINIC | Age: 30
End: 2025-08-07
Payer: MEDICAID

## 2025-08-07 DIAGNOSIS — Z12.4 ENCOUNTER FOR SCREENING FOR MALIGNANT NEOPLASM OF CERVIX: ICD-10-CM

## 2025-08-07 PROCEDURE — 88175 CYTOPATH C/V AUTO FLUID REDO: CPT | Mod: ORL | Performed by: INTERNAL MEDICINE

## 2025-08-13 LAB
BKR LAB AP GYN ADEQUACY: NORMAL
BKR LAB AP GYN INTERPRETATION: NORMAL
BKR LAB AP HPV REFLEX: NORMAL
BKR LAB AP LMP: NORMAL
BKR LAB AP PREVIOUS ABNL DX: NORMAL
BKR LAB AP PREVIOUS ABNORMAL: NORMAL
PATH REPORT.COMMENTS IMP SPEC: NORMAL
PATH REPORT.COMMENTS IMP SPEC: NORMAL
PATH REPORT.RELEVANT HX SPEC: NORMAL

## 2025-08-19 ENCOUNTER — LAB REQUISITION (OUTPATIENT)
Dept: LAB | Facility: CLINIC | Age: 30
End: 2025-08-19

## 2025-08-19 DIAGNOSIS — Z12.4 ENCOUNTER FOR SCREENING FOR MALIGNANT NEOPLASM OF CERVIX: ICD-10-CM

## 2025-08-21 LAB
HPV HR 12 DNA CVX QL NAA+PROBE: NEGATIVE
HPV16 DNA CVX QL NAA+PROBE: NEGATIVE
HPV18 DNA CVX QL NAA+PROBE: NEGATIVE
HUMAN PAPILLOMA VIRUS FINAL DIAGNOSIS: NORMAL